# Patient Record
Sex: FEMALE | Race: WHITE | NOT HISPANIC OR LATINO | Employment: UNEMPLOYED | URBAN - METROPOLITAN AREA
[De-identification: names, ages, dates, MRNs, and addresses within clinical notes are randomized per-mention and may not be internally consistent; named-entity substitution may affect disease eponyms.]

---

## 2017-04-24 ENCOUNTER — PROCEDURE VISIT (OUTPATIENT)
Dept: OBSTETRICS AND GYNECOLOGY | Age: 27
End: 2017-04-24

## 2017-04-24 ENCOUNTER — OFFICE VISIT (OUTPATIENT)
Dept: OBSTETRICS AND GYNECOLOGY | Age: 27
End: 2017-04-24

## 2017-04-24 ENCOUNTER — TELEPHONE (OUTPATIENT)
Dept: OBSTETRICS AND GYNECOLOGY | Age: 27
End: 2017-04-24

## 2017-04-24 VITALS
HEIGHT: 69 IN | WEIGHT: 129 LBS | SYSTOLIC BLOOD PRESSURE: 122 MMHG | DIASTOLIC BLOOD PRESSURE: 70 MMHG | BODY MASS INDEX: 19.11 KG/M2

## 2017-04-24 DIAGNOSIS — O20.0 THREATENED MISCARRIAGE IN EARLY PREGNANCY: Primary | ICD-10-CM

## 2017-04-24 DIAGNOSIS — O20.0 THREATENED ABORTION: Primary | ICD-10-CM

## 2017-04-24 PROCEDURE — 99213 OFFICE O/P EST LOW 20 MIN: CPT | Performed by: OBSTETRICS & GYNECOLOGY

## 2017-04-24 PROCEDURE — 76817 TRANSVAGINAL US OBSTETRIC: CPT | Performed by: OBSTETRICS & GYNECOLOGY

## 2017-04-24 RX ORDER — PRENATAL VIT NO.126/IRON/FOLIC 28MG-0.8MG
TABLET ORAL DAILY
COMMUNITY
End: 2018-11-08

## 2017-04-24 RX ORDER — CHLORAL HYDRATE 500 MG
CAPSULE ORAL
COMMUNITY
End: 2017-06-28

## 2017-04-24 NOTE — PROGRESS NOTES
Subjective     Chief Complaint   Patient presents with   • Gynecologic Exam     Early ob with spotting,lmp 3/10/17,u/s       History of Present Illness  Iraida Solo is a 27 y.o. female is being seen today for   Chief Complaint   Patient presents with   • Gynecologic Exam     Early ob with spotting,lmp 3/10/17,u/s   Patient thinks she should be 5-6 weeks.  Has had some spotting for last 3 or 4 days and comes in for evaluation.  She has no nausea or vomiting or breast tenderness but does complain of fatigue..  Today's ultrasound is consistent with a 6 week intrauterine pregnancy positive fetal heart tones however heart rate is only in the 70s.        The following portions of the patient's history were reviewed and updated as appropriate: allergies, current medications, past family history, past medical history, past social history, past surgical history and problem list.    PAST MEDICAL HISTORY  Past Medical History:   Diagnosis Date   • Anxiety    • Migraines    • Vaginal delivery      OB History      Para Term  AB TAB SAB Ectopic Multiple Living    1         1        No past surgical history on file.  No family history on file.  History   Smoking Status   • Never Smoker   Smokeless Tobacco   • Not on file       Current Outpatient Prescriptions:   •  Omega-3 Fatty Acids (FISH OIL) 1000 MG capsule capsule, Take  by mouth Daily With Breakfast., Disp: , Rfl:   •  Prenatal Vit-Fe Fumarate-FA (PRENATAL, CLASSIC, VITAMIN) 28-0.8 MG tablet tablet, Take  by mouth Daily., Disp: , Rfl:   •  Norethin-Eth Estrad-Fe Biphas (LO LOESTRIN FE) 1 MG-10 MCG / 10 MCG tablet, Take  by mouth., Disp: , Rfl:     There is no immunization history on file for this patient.    Review of Systems       Except as outlined in history of physical illness, patient denies any changes in her GYN, , GI systems. All other systems reviewed are negative.    Objective   Physical Exam     Alert and oriented ×3  Heart regular rate and  rhythm  Abdomen soft nontender no rebound no guarding  External genitalia normal vagina shows a little bit of dark blood.  Cervix is closed uterus is 6 weeks' size adnexa not enlarged nontender      Assessment/Plan   Iraida was seen today for gynecologic exam.    Diagnoses and all orders for this visit:    Threatened miscarriage in early pregnancy  -     HCG, B-subunit, Quantitative  -     Progesterone  We had a lengthy discussion regarding the low fetal heart rate and likelihood of miscarriage.  Patient is tensely scheduled for a follow-up ultrasound next Monday.  We will call with lab results tomorrow and she will call if she hasn't heard from us.  She is known to be Rh+             EMR Dragon/ Transcription disclaimer:  Much of the encounter note is an electronic transcription/translation of spoken language to printed text. The electronic translation of spoken language may permit erroneous, or at times, nonessential words or phrases to be inadvertently transcribes; Although i have reviewed the note for such errors, some may still exist.

## 2017-04-25 ENCOUNTER — TELEPHONE (OUTPATIENT)
Dept: OBSTETRICS AND GYNECOLOGY | Age: 27
End: 2017-04-25

## 2017-04-25 LAB
HCG INTACT+B SERPL-ACNC: NORMAL MIU/ML
PROGEST SERPL-MCNC: 2.2 NG/ML

## 2017-04-25 NOTE — TELEPHONE ENCOUNTER
----- Message from Jackson Jimenez MD sent at 4/25/2017  9:31 AM EDT -----  Coby please let patient know that her hormone levels were low for gestational age.  Still would like to follow-up with schedule ultrasound next week.  Same precautions that we discussed yesterday

## 2017-04-25 NOTE — PROGRESS NOTES
Coby please let patient know that her hormone levels were low for gestational age.  Still would like to follow-up with schedule ultrasound next week.  Same precautions that we discussed yesterday

## 2017-05-01 ENCOUNTER — TELEPHONE (OUTPATIENT)
Dept: OBSTETRICS AND GYNECOLOGY | Age: 27
End: 2017-05-01

## 2017-05-02 ENCOUNTER — PROCEDURE VISIT (OUTPATIENT)
Dept: OBSTETRICS AND GYNECOLOGY | Age: 27
End: 2017-05-02

## 2017-05-02 ENCOUNTER — ROUTINE PRENATAL (OUTPATIENT)
Dept: OBSTETRICS AND GYNECOLOGY | Age: 27
End: 2017-05-02

## 2017-05-02 DIAGNOSIS — O02.1 MISSED AB: Primary | ICD-10-CM

## 2017-05-02 DIAGNOSIS — O03.9 SPONTANEOUS MISCARRIAGE: Primary | ICD-10-CM

## 2017-05-02 PROCEDURE — 76817 TRANSVAGINAL US OBSTETRIC: CPT | Performed by: OBSTETRICS & GYNECOLOGY

## 2017-05-02 PROCEDURE — 99212 OFFICE O/P EST SF 10 MIN: CPT | Performed by: OBSTETRICS & GYNECOLOGY

## 2017-06-12 ENCOUNTER — TELEPHONE (OUTPATIENT)
Dept: OBSTETRICS AND GYNECOLOGY | Age: 27
End: 2017-06-12

## 2017-06-12 NOTE — TELEPHONE ENCOUNTER
Congratulations, recommend patient being worked in with me this Thursday at 11:15 AM.  I'm going to try to work a couple patients and that they  Also encourage prenatal vitamins.  If we can get an ultrasound scheduled about that time that would be great.  If not I still want to see her

## 2017-06-12 NOTE — TELEPHONE ENCOUNTER
"Pt had a miscarriage on 04-24-17. Pt got a + preg test over the weekend, LMP: 05-18-17. Due to her history of miscarriages, pt was told to call Dr PEREZ as soon as she got a + and he would want to bring her in early.  Pt also wanted to let Dr PEREZ know that she is having some pain in her L ovary. Pt related pain to the \"twinge\" she gets when ovulating. Please advise.     Pt#: [phone]  "

## 2017-06-15 ENCOUNTER — PROCEDURE VISIT (OUTPATIENT)
Dept: OBSTETRICS AND GYNECOLOGY | Age: 27
End: 2017-06-15

## 2017-06-15 ENCOUNTER — INITIAL PRENATAL (OUTPATIENT)
Dept: OBSTETRICS AND GYNECOLOGY | Age: 27
End: 2017-06-15

## 2017-06-15 VITALS — WEIGHT: 130 LBS | DIASTOLIC BLOOD PRESSURE: 60 MMHG | SYSTOLIC BLOOD PRESSURE: 108 MMHG | BODY MASS INDEX: 19.2 KG/M2

## 2017-06-15 DIAGNOSIS — Z34.81 ENCOUNTER FOR SUPERVISION OF OTHER NORMAL PREGNANCY IN FIRST TRIMESTER: Primary | ICD-10-CM

## 2017-06-15 DIAGNOSIS — O36.80X0 ENCOUNTER TO DETERMINE FETAL VIABILITY OF PREGNANCY, NOT APPLICABLE OR UNSPECIFIED FETUS: Primary | ICD-10-CM

## 2017-06-15 DIAGNOSIS — Z3A.01 LESS THAN 8 WEEKS GESTATION OF PREGNANCY: ICD-10-CM

## 2017-06-15 PROCEDURE — 0501F PRENATAL FLOW SHEET: CPT | Performed by: OBSTETRICS & GYNECOLOGY

## 2017-06-15 PROCEDURE — 76817 TRANSVAGINAL US OBSTETRIC: CPT | Performed by: OBSTETRICS & GYNECOLOGY

## 2017-06-15 NOTE — PROGRESS NOTES
27-year-old  Ab2 white female in for pregnancy confirmation.  Patient feels like she ovulated on the .  2 early to see anything on today's ultrasound.  Physical exam was normal patient's known to be Rh+.  She's had no bleeding some breast tenderness some nausea.  Physical exam unremarkable consistent with 4-5 weeks.  Patient has a little bit of anxiety and requesting a follow-up ultrasound next week.  Miscarriage and ectopic warning signs given

## 2017-06-16 LAB
ABO GROUP BLD: (no result)
BASOPHILS # BLD AUTO: 0 X10E3/UL (ref 0–0.2)
BASOPHILS NFR BLD AUTO: 0 %
BLD GP AB SCN SERPL QL: NEGATIVE
EOSINOPHIL # BLD AUTO: 0.1 X10E3/UL (ref 0–0.4)
EOSINOPHIL NFR BLD AUTO: 2 %
ERYTHROCYTE [DISTWIDTH] IN BLOOD BY AUTOMATED COUNT: 12.8 % (ref 12.3–15.4)
HBV SURFACE AG SERPL QL IA: NEGATIVE
HCG INTACT+B SERPL-ACNC: 990.8 MIU/ML
HCT VFR BLD AUTO: 39.1 % (ref 34–46.6)
HCV AB S/CO SERPL IA: <0.1 S/CO RATIO (ref 0–0.9)
HGB BLD-MCNC: 13.3 G/DL (ref 11.1–15.9)
HIV 1+2 AB+HIV1 P24 AG SERPL QL IA: NON REACTIVE
IMM GRANULOCYTES # BLD: 0 X10E3/UL (ref 0–0.1)
IMM GRANULOCYTES NFR BLD: 0 %
LYMPHOCYTES # BLD AUTO: 1.7 X10E3/UL (ref 0.7–3.1)
LYMPHOCYTES NFR BLD AUTO: 27 %
MCH RBC QN AUTO: 29 PG (ref 26.6–33)
MCHC RBC AUTO-ENTMCNC: 34 G/DL (ref 31.5–35.7)
MCV RBC AUTO: 85 FL (ref 79–97)
MONOCYTES # BLD AUTO: 0.5 X10E3/UL (ref 0.1–0.9)
MONOCYTES NFR BLD AUTO: 7 %
NEUTROPHILS # BLD AUTO: 4.1 X10E3/UL (ref 1.4–7)
NEUTROPHILS NFR BLD AUTO: 64 %
PLATELET # BLD AUTO: 208 X10E3/UL (ref 150–379)
PROGEST SERPL-MCNC: 20.8 NG/ML
RBC # BLD AUTO: 4.58 X10E6/UL (ref 3.77–5.28)
RH BLD: POSITIVE
RPR SER QL: NON REACTIVE
RUBV IGG SERPL IA-ACNC: 1.32 INDEX
VZV IGG SER IA-ACNC: 3849 INDEX
WBC # BLD AUTO: 6.5 X10E3/UL (ref 3.4–10.8)

## 2017-06-17 LAB
BACTERIA UR CULT: NO GROWTH
BACTERIA UR CULT: NORMAL

## 2017-06-19 ENCOUNTER — TELEPHONE (OUTPATIENT)
Dept: OBSTETRICS AND GYNECOLOGY | Age: 27
End: 2017-06-19

## 2017-06-19 NOTE — TELEPHONE ENCOUNTER
Pt was seen on 06-15-17. Pt called today looking for lab results from that visit. Labs have not been reviewed. Please advise.     Pt#: [phone]

## 2017-06-19 NOTE — TELEPHONE ENCOUNTER
Please notify patient that if her gallbladder is continued act up would recommend following up with her PCP.  Keep an eye on the pelvic spotting but I'm glad it has discontinued.  Let us know if anything changes as far as vaginal bleeding goes.

## 2017-06-19 NOTE — TELEPHONE ENCOUNTER
Pt not'd of lab results.     Pt did state that she had a brown discharge Saturday morning and yesterday evening. Pt states it was only those two times and it was just on the toilet paper. Pt has not have it since then. Pt also stated that she thinks her gallbladder is acting up. Pt states that wed-fri of last week her poop was green and smelled really weird. It is more normal now. Pt states she has also been having some back pain and some pain under her ribs as well. Please advise.

## 2017-06-28 ENCOUNTER — ROUTINE PRENATAL (OUTPATIENT)
Dept: OBSTETRICS AND GYNECOLOGY | Age: 27
End: 2017-06-28

## 2017-06-28 ENCOUNTER — PROCEDURE VISIT (OUTPATIENT)
Dept: OBSTETRICS AND GYNECOLOGY | Age: 27
End: 2017-06-28

## 2017-06-28 VITALS — SYSTOLIC BLOOD PRESSURE: 120 MMHG | DIASTOLIC BLOOD PRESSURE: 70 MMHG | WEIGHT: 129 LBS | BODY MASS INDEX: 19.05 KG/M2

## 2017-06-28 DIAGNOSIS — Z13.9 SCREENING: Primary | ICD-10-CM

## 2017-06-28 DIAGNOSIS — O36.80X0 PREGNANCY WITH INCONCLUSIVE FETAL VIABILITY, NOT APPLICABLE OR UNSPECIFIED FETUS: ICD-10-CM

## 2017-06-28 DIAGNOSIS — Z3A.01 5 WEEKS GESTATION OF PREGNANCY: ICD-10-CM

## 2017-06-28 DIAGNOSIS — O26.851 SPOTTING AFFECTING PREGNANCY IN FIRST TRIMESTER: ICD-10-CM

## 2017-06-28 PROCEDURE — 76817 TRANSVAGINAL US OBSTETRIC: CPT | Performed by: OBSTETRICS & GYNECOLOGY

## 2017-06-28 PROCEDURE — 0502F SUBSEQUENT PRENATAL CARE: CPT | Performed by: OBSTETRICS & GYNECOLOGY

## 2017-06-28 NOTE — PROGRESS NOTES
Ultrasound today consistent with 6 weeks positive cardiac activity.  We will recheck progesterone level repeat ultrasound at next visit.  Patient is symptomatic.  No vaginal bleeding.  She will cause with any change in her condition

## 2017-06-29 ENCOUNTER — TELEPHONE (OUTPATIENT)
Dept: OBSTETRICS AND GYNECOLOGY | Age: 27
End: 2017-06-29

## 2017-06-29 LAB — PROGEST SERPL-MCNC: 12 NG/ML

## 2017-06-29 NOTE — TELEPHONE ENCOUNTER
----- Message from Jackson Jimenez MD sent at 6/29/2017  7:44 AM EDT -----  Please call the patient regarding her normal result.  Coby I believe a previous message was sent but please let patient know progesterone was in the normal range but low normal and I have called her in some Prometrium to take until her next appointment

## 2017-07-05 ENCOUNTER — TELEPHONE (OUTPATIENT)
Dept: OBSTETRICS AND GYNECOLOGY | Age: 27
End: 2017-07-05

## 2017-07-05 DIAGNOSIS — Z32.01 POSITIVE PREGNANCY TEST: Primary | ICD-10-CM

## 2017-07-05 NOTE — TELEPHONE ENCOUNTER
Dr PEREZ pt, has hx of miscarriage and would like to come in for hcg and progesterone. Can we place these orders so pt can come in Friday for lab work?    Pt # [phone] ok to leave VM that lab orders have been placed

## 2017-07-08 LAB
HCG INTACT+B SERPL-ACNC: NORMAL MIU/ML
PROGEST SERPL-MCNC: 10.8 NG/ML

## 2017-07-10 ENCOUNTER — TELEPHONE (OUTPATIENT)
Dept: OBSTETRICS AND GYNECOLOGY | Age: 27
End: 2017-07-10

## 2017-07-10 NOTE — TELEPHONE ENCOUNTER
Pt knows that her progesterone and HCG labs were coming back today. Pt just wanted to let Dr Jimenez and Coby know that if her progesterone levels are a little low it is probably because she forgot to take her 200 mg progesterone capsules for about 3 days while on vacation last week. She didn't take them 07/3-07/6. Pt didn't take her capsules again until the night that she came in for her labs.

## 2017-07-10 NOTE — TELEPHONE ENCOUNTER
Please let patient know she is correct her progesterone is just barely low and resuming the progesterone is correct. HCG value has increased nicely. Keep follow-up appointment

## 2017-07-11 ENCOUNTER — TELEPHONE (OUTPATIENT)
Dept: OBSTETRICS AND GYNECOLOGY | Age: 27
End: 2017-07-11

## 2017-07-11 DIAGNOSIS — Z13.9 SCREENING: Primary | ICD-10-CM

## 2017-07-13 ENCOUNTER — TELEPHONE (OUTPATIENT)
Dept: OBSTETRICS AND GYNECOLOGY | Age: 27
End: 2017-07-13

## 2017-07-13 LAB
HCG INTACT+B SERPL-ACNC: NORMAL MIU/ML
PROGEST SERPL-MCNC: 17.5 NG/ML

## 2017-07-13 NOTE — TELEPHONE ENCOUNTER
----- Message from Jackson Jimenez MD sent at 7/13/2017 12:34 PM EDT -----  Please notify pt. That labs are with in normal limits

## 2017-07-26 ENCOUNTER — PROCEDURE VISIT (OUTPATIENT)
Dept: OBSTETRICS AND GYNECOLOGY | Age: 27
End: 2017-07-26

## 2017-07-26 ENCOUNTER — ROUTINE PRENATAL (OUTPATIENT)
Dept: OBSTETRICS AND GYNECOLOGY | Age: 27
End: 2017-07-26

## 2017-07-26 VITALS — SYSTOLIC BLOOD PRESSURE: 122 MMHG | DIASTOLIC BLOOD PRESSURE: 70 MMHG | WEIGHT: 132 LBS | BODY MASS INDEX: 19.49 KG/M2

## 2017-07-26 DIAGNOSIS — Z13.9 SCREENING: Primary | ICD-10-CM

## 2017-07-26 DIAGNOSIS — Z34.81 PRENATAL CARE, SUBSEQUENT PREGNANCY, FIRST TRIMESTER: ICD-10-CM

## 2017-07-26 DIAGNOSIS — O36.80X0 PREGNANCY WITH INCONCLUSIVE FETAL VIABILITY, NOT APPLICABLE OR UNSPECIFIED FETUS: ICD-10-CM

## 2017-07-26 DIAGNOSIS — Z3A.09 9 WEEKS GESTATION OF PREGNANCY: Primary | ICD-10-CM

## 2017-07-26 PROCEDURE — 76817 TRANSVAGINAL US OBSTETRIC: CPT | Performed by: OBSTETRICS & GYNECOLOGY

## 2017-07-26 PROCEDURE — 0502F SUBSEQUENT PRENATAL CARE: CPT | Performed by: OBSTETRICS & GYNECOLOGY

## 2017-07-26 NOTE — PROGRESS NOTES
Patient still with nausea no bleeding or spotting or cramping ultrasound positive heart beat today and good growth. Continue Prometrium until 13 weeks we will check a progesterone level today patient to call with any problems questions concerns

## 2017-07-27 ENCOUNTER — TELEPHONE (OUTPATIENT)
Dept: OBSTETRICS AND GYNECOLOGY | Age: 27
End: 2017-07-27

## 2017-07-28 NOTE — TELEPHONE ENCOUNTER
Those results still are not back.  I verified with the lab that it was drawn and sent but there is still no result.

## 2017-07-30 LAB — 17OHP SERPL-MCNC: 59 NG/DL

## 2017-07-31 ENCOUNTER — TELEPHONE (OUTPATIENT)
Dept: OBSTETRICS AND GYNECOLOGY | Age: 27
End: 2017-07-31

## 2017-08-25 ENCOUNTER — ROUTINE PRENATAL (OUTPATIENT)
Dept: OBSTETRICS AND GYNECOLOGY | Age: 27
End: 2017-08-25

## 2017-08-25 VITALS — WEIGHT: 140.4 LBS | BODY MASS INDEX: 20.73 KG/M2

## 2017-08-25 DIAGNOSIS — Z13.9 SCREENING: Primary | ICD-10-CM

## 2017-08-25 LAB
BILIRUB BLD-MCNC: NEGATIVE MG/DL
CLARITY, POC: CLEAR
COLOR UR: YELLOW
GLUCOSE UR STRIP-MCNC: NEGATIVE MG/DL
KETONES UR QL: NEGATIVE
LEUKOCYTE EST, POC: NEGATIVE
NITRITE UR-MCNC: NEGATIVE MG/ML
PH UR: 6.5 [PH] (ref 5–8)
PROT UR STRIP-MCNC: NEGATIVE MG/DL
RBC # UR STRIP: NEGATIVE /UL
SP GR UR: 1.02 (ref 1–1.03)
UROBILINOGEN UR QL: NORMAL

## 2017-08-25 PROCEDURE — 81002 URINALYSIS NONAUTO W/O SCOPE: CPT | Performed by: PHYSICIAN ASSISTANT

## 2017-08-25 PROCEDURE — 0502F SUBSEQUENT PRENATAL CARE: CPT | Performed by: PHYSICIAN ASSISTANT

## 2017-08-25 NOTE — PROGRESS NOTES
This is a 14 wk pregnant pt that is here for a routine belly check. She has no c/o. Possibly starting to feel some movement.  Feels better, eating well and notes an 8 lb weight gain.  She does note that she was underweight when she became pregnant. Denies excess eating and I just advised her to make healthy choices.  A/P 14 wk pregnant pt here for routine check up, plan f/u in 4 wks and can plan anatomic scan at that time. Call for any problems

## 2017-09-20 ENCOUNTER — PROCEDURE VISIT (OUTPATIENT)
Dept: OBSTETRICS AND GYNECOLOGY | Age: 27
End: 2017-09-20

## 2017-09-20 ENCOUNTER — ROUTINE PRENATAL (OUTPATIENT)
Dept: OBSTETRICS AND GYNECOLOGY | Age: 27
End: 2017-09-20

## 2017-09-20 VITALS — DIASTOLIC BLOOD PRESSURE: 70 MMHG | SYSTOLIC BLOOD PRESSURE: 120 MMHG | WEIGHT: 144 LBS | BODY MASS INDEX: 21.27 KG/M2

## 2017-09-20 DIAGNOSIS — Z3A.18 18 WEEKS GESTATION OF PREGNANCY: Primary | ICD-10-CM

## 2017-09-20 DIAGNOSIS — Z36.89 SCREENING, ANTENATAL, FOR FETAL ANATOMIC SURVEY: ICD-10-CM

## 2017-09-20 PROCEDURE — 76805 OB US >/= 14 WKS SNGL FETUS: CPT | Performed by: OBSTETRICS & GYNECOLOGY

## 2017-09-20 PROCEDURE — 0502F SUBSEQUENT PRENATAL CARE: CPT | Performed by: OBSTETRICS & GYNECOLOGY

## 2017-09-20 NOTE — PROGRESS NOTES
HPI: 27 y.o.  at 18w3d , who underwent ultrasound today consistent with a baby boy. Limitations of ultrasound reviewed. Confirms working due date of 218    Vitals:    17 0942   BP: 120/70   Weight: 144 lb (65.3 kg)     [unfilled]      ROS:  GI:  Negative  : No bleeding no discharge  Pulmonary: Negative     A/P  1. Intrauterine pregnancy at 18w3d   2. Pregnancy Risk:  NORMAL    Iraida was seen today for pregnancy ultrasound.    Diagnoses and all orders for this visit:    18 weeks gestation of pregnancy    If patient doesn't get flu shot at work we will receive next visit    -----------------------    PLAN:   Return in about 4 weeks (around 10/18/2017) for Recheck.      Jackson Jimenez MD  2017 10:24 AM

## 2017-09-29 ENCOUNTER — CLINICAL SUPPORT (OUTPATIENT)
Dept: OBSTETRICS AND GYNECOLOGY | Age: 27
End: 2017-09-29

## 2017-09-29 DIAGNOSIS — Z23 NEED FOR IMMUNIZATION AGAINST INFLUENZA: ICD-10-CM

## 2017-09-29 DIAGNOSIS — Z34.90 PREGNANCY, UNSPECIFIED GESTATIONAL AGE: ICD-10-CM

## 2017-09-29 PROCEDURE — 90471 IMMUNIZATION ADMIN: CPT | Performed by: OBSTETRICS & GYNECOLOGY

## 2017-09-29 PROCEDURE — 90686 IIV4 VACC NO PRSV 0.5 ML IM: CPT | Performed by: OBSTETRICS & GYNECOLOGY

## 2017-10-16 ENCOUNTER — TELEPHONE (OUTPATIENT)
Dept: OBSTETRICS AND GYNECOLOGY | Age: 27
End: 2017-10-16

## 2017-10-16 ENCOUNTER — OFFICE VISIT (OUTPATIENT)
Dept: OBSTETRICS AND GYNECOLOGY | Age: 27
End: 2017-10-16

## 2017-10-16 ENCOUNTER — ROUTINE PRENATAL (OUTPATIENT)
Dept: OBSTETRICS AND GYNECOLOGY | Age: 27
End: 2017-10-16

## 2017-10-16 VITALS — BODY MASS INDEX: 22.15 KG/M2 | DIASTOLIC BLOOD PRESSURE: 76 MMHG | SYSTOLIC BLOOD PRESSURE: 120 MMHG | WEIGHT: 150 LBS

## 2017-10-16 VITALS — SYSTOLIC BLOOD PRESSURE: 120 MMHG | DIASTOLIC BLOOD PRESSURE: 76 MMHG

## 2017-10-16 DIAGNOSIS — Z3A.22 22 WEEKS GESTATION OF PREGNANCY: ICD-10-CM

## 2017-10-16 DIAGNOSIS — Z3A.22 22 WEEKS GESTATION OF PREGNANCY: Primary | ICD-10-CM

## 2017-10-16 PROCEDURE — 99213 OFFICE O/P EST LOW 20 MIN: CPT | Performed by: OBSTETRICS & GYNECOLOGY

## 2017-10-16 NOTE — PROGRESS NOTES
HPI: 27 y.o.  at 22w1d who called in and ordered be seen because she had what she thought was a varicose vein on her left perineum. She's had good fetal activity no contractions no bleeding no discharge    Vitals:    10/16/17 1507   BP: 120/76   Weight: 150 lb (68 kg)         ROS:  GI:  Negative  : No bleeding, no change in bowel movements,  Pulmonary: Negative     Extra genitalia normal except for the presence of small varicose veins on the left perineum vagina normal cervix is long thick and closed fetal heart tones normal extremities normal    A/P  1. Intrauterine pregnancy at 22w1d   2. Pregnancy Risk:  NORMAL    Iraida was seen today for pregnancy problem.    Diagnoses and all orders for this visit:    Varicose vein of leg, postpartum  Symptomatic relief discussed,  22 weeks gestation of pregnancy  : Hemoglobin next visit, patient has artery received flu shot, pertussis next visit.      -----------------------    PLAN:   Return in about 4 weeks (around 2017) for Recheck.      Jackson Jimenez MD  10/16/2017 3:14 PM

## 2017-10-16 NOTE — TELEPHONE ENCOUNTER
Pt is 22 wks preg and states since last Thursday has noticed an increase in pelvic pressure. Does become painful the longer she stands, has to frequently rest and elevate legs. Pt states she has also noticed the vein on her L vulva in really showing/swollen/sore. Pt would like to be seen today. Please advise.    Pt # [phone]

## 2017-11-15 ENCOUNTER — ROUTINE PRENATAL (OUTPATIENT)
Dept: OBSTETRICS AND GYNECOLOGY | Age: 27
End: 2017-11-15

## 2017-11-15 VITALS — WEIGHT: 157 LBS | DIASTOLIC BLOOD PRESSURE: 70 MMHG | BODY MASS INDEX: 23.18 KG/M2 | SYSTOLIC BLOOD PRESSURE: 118 MMHG

## 2017-11-15 DIAGNOSIS — Z34.82 PRENATAL CARE, SUBSEQUENT PREGNANCY, SECOND TRIMESTER: Primary | ICD-10-CM

## 2017-11-15 LAB
GLUCOSE 1H P 50 G GLC PO SERPL-MCNC: 98 MG/DL (ref 65–139)
HGB BLD-MCNC: 11.4 G/DL (ref 11.9–15.5)

## 2017-11-15 PROCEDURE — 90471 IMMUNIZATION ADMIN: CPT | Performed by: OBSTETRICS & GYNECOLOGY

## 2017-11-15 PROCEDURE — 0502F SUBSEQUENT PRENATAL CARE: CPT | Performed by: OBSTETRICS & GYNECOLOGY

## 2017-11-15 PROCEDURE — 90715 TDAP VACCINE 7 YRS/> IM: CPT | Performed by: OBSTETRICS & GYNECOLOGY

## 2017-11-15 NOTE — PROGRESS NOTES
Patient feeling better, good fetal movement, glucose hemoglobin and pertussis today fetal heart tones fundal height extremities normal return in 4 weeks call with any change

## 2017-11-16 ENCOUNTER — TELEPHONE (OUTPATIENT)
Dept: OBSTETRICS AND GYNECOLOGY | Age: 27
End: 2017-11-16

## 2017-12-13 ENCOUNTER — ROUTINE PRENATAL (OUTPATIENT)
Dept: OBSTETRICS AND GYNECOLOGY | Age: 27
End: 2017-12-13

## 2017-12-13 VITALS — SYSTOLIC BLOOD PRESSURE: 122 MMHG | BODY MASS INDEX: 24.07 KG/M2 | WEIGHT: 163 LBS | DIASTOLIC BLOOD PRESSURE: 72 MMHG

## 2017-12-13 DIAGNOSIS — Z3A.30 30 WEEKS GESTATION OF PREGNANCY: Primary | ICD-10-CM

## 2017-12-13 PROCEDURE — 0502F SUBSEQUENT PRENATAL CARE: CPT | Performed by: OBSTETRICS & GYNECOLOGY

## 2017-12-13 NOTE — PROGRESS NOTES
Patient in for prenatal visit, overall doing well fetal heart tones fundal height extremities normal reviewed signs symptoms of labor. Discussed the fact that she went a little bit early last delivery and had an uncomplicated delivery.

## 2017-12-29 ENCOUNTER — ROUTINE PRENATAL (OUTPATIENT)
Dept: OBSTETRICS AND GYNECOLOGY | Age: 27
End: 2017-12-29

## 2017-12-29 VITALS — DIASTOLIC BLOOD PRESSURE: 62 MMHG | SYSTOLIC BLOOD PRESSURE: 114 MMHG | BODY MASS INDEX: 24.54 KG/M2 | WEIGHT: 166.2 LBS

## 2017-12-29 DIAGNOSIS — Z3A.32 32 WEEKS GESTATION OF PREGNANCY: Primary | ICD-10-CM

## 2017-12-29 PROCEDURE — 0502F SUBSEQUENT PRENATAL CARE: CPT | Performed by: OBSTETRICS & GYNECOLOGY

## 2017-12-29 NOTE — PROGRESS NOTES
32 week IUP in for prenatal visit overall patient states she is doing well good fetal activity no concerns or complaints fundal height fetal heart tones extremities normal signs symptoms of labor reviewed return in 2 weeks call as needed

## 2018-01-10 ENCOUNTER — ROUTINE PRENATAL (OUTPATIENT)
Dept: OBSTETRICS AND GYNECOLOGY | Age: 28
End: 2018-01-10

## 2018-01-10 VITALS — DIASTOLIC BLOOD PRESSURE: 66 MMHG | BODY MASS INDEX: 24.37 KG/M2 | WEIGHT: 165 LBS | SYSTOLIC BLOOD PRESSURE: 110 MMHG

## 2018-01-10 DIAGNOSIS — Z3A.34 34 WEEKS GESTATION OF PREGNANCY: Primary | ICD-10-CM

## 2018-01-10 PROCEDURE — 0502F SUBSEQUENT PRENATAL CARE: CPT | Performed by: OBSTETRICS & GYNECOLOGY

## 2018-01-10 NOTE — PROGRESS NOTES
Overall doing well occasional mild contractions fetal heart tones fundal height extremities normal reviewed signs symptoms of labor check cervix and GBS next visit

## 2018-01-24 ENCOUNTER — ROUTINE PRENATAL (OUTPATIENT)
Dept: OBSTETRICS AND GYNECOLOGY | Age: 28
End: 2018-01-24

## 2018-01-24 VITALS — BODY MASS INDEX: 25.1 KG/M2 | WEIGHT: 170 LBS | DIASTOLIC BLOOD PRESSURE: 72 MMHG | SYSTOLIC BLOOD PRESSURE: 118 MMHG

## 2018-01-24 DIAGNOSIS — Z34.83 PRENATAL CARE, SUBSEQUENT PREGNANCY, THIRD TRIMESTER: Primary | ICD-10-CM

## 2018-01-24 PROCEDURE — 0502F SUBSEQUENT PRENATAL CARE: CPT | Performed by: OBSTETRICS & GYNECOLOGY

## 2018-01-24 NOTE — PROGRESS NOTES
36 week IUP, view of systems negative, fetal heart tones fundal height extremities normal cervix fingertip 50% -2 signs symptoms of labor reviewed GBS collected note to stop working February 1 given to patient

## 2018-01-26 LAB — GP B STREP DNA SPEC QL NAA+PROBE: NEGATIVE

## 2018-01-30 ENCOUNTER — ROUTINE PRENATAL (OUTPATIENT)
Dept: OBSTETRICS AND GYNECOLOGY | Age: 28
End: 2018-01-30

## 2018-01-30 VITALS — DIASTOLIC BLOOD PRESSURE: 70 MMHG | WEIGHT: 173 LBS | BODY MASS INDEX: 25.55 KG/M2 | SYSTOLIC BLOOD PRESSURE: 114 MMHG

## 2018-01-30 DIAGNOSIS — Z34.80 PRENATAL CARE OF MULTIGRAVIDA, ANTEPARTUM: Primary | ICD-10-CM

## 2018-01-30 DIAGNOSIS — O99.019 ANEMIA AFFECTING PREGNANCY, ANTEPARTUM: ICD-10-CM

## 2018-01-30 PROCEDURE — 0502F SUBSEQUENT PRENATAL CARE: CPT | Performed by: OBSTETRICS & GYNECOLOGY

## 2018-01-30 NOTE — PROGRESS NOTES
37 weeks, review of systems negative, fundal height fetal heart tones extremities normal cervix 1 cm 50% -2 vertex, GBS negative, signs symptoms labor reviewed continue prenatal vitamins and iron

## 2018-02-06 ENCOUNTER — ROUTINE PRENATAL (OUTPATIENT)
Dept: OBSTETRICS AND GYNECOLOGY | Age: 28
End: 2018-02-06

## 2018-02-06 VITALS — DIASTOLIC BLOOD PRESSURE: 80 MMHG | WEIGHT: 175 LBS | SYSTOLIC BLOOD PRESSURE: 138 MMHG | BODY MASS INDEX: 25.84 KG/M2

## 2018-02-06 DIAGNOSIS — Z3A.38 38 WEEKS GESTATION OF PREGNANCY: Primary | ICD-10-CM

## 2018-02-06 PROCEDURE — 0502F SUBSEQUENT PRENATAL CARE: CPT | Performed by: OBSTETRICS & GYNECOLOGY

## 2018-02-06 NOTE — PROGRESS NOTES
Overall doing well, good fetal activity no bleeding no leakage of fluid, cervix to 60% -2 but still very posterior  On symptoms of labor reviewed

## 2018-02-13 ENCOUNTER — ROUTINE PRENATAL (OUTPATIENT)
Dept: OBSTETRICS AND GYNECOLOGY | Age: 28
End: 2018-02-13

## 2018-02-13 VITALS — SYSTOLIC BLOOD PRESSURE: 124 MMHG | DIASTOLIC BLOOD PRESSURE: 74 MMHG | WEIGHT: 176 LBS | BODY MASS INDEX: 25.99 KG/M2

## 2018-02-13 DIAGNOSIS — Z34.80 PRENATAL CARE OF MULTIGRAVIDA, ANTEPARTUM: Primary | ICD-10-CM

## 2018-02-13 DIAGNOSIS — O99.019 ANEMIA AFFECTING PREGNANCY, ANTEPARTUM: ICD-10-CM

## 2018-02-13 PROCEDURE — 0502F SUBSEQUENT PRENATAL CARE: CPT | Performed by: OBSTETRICS & GYNECOLOGY

## 2018-02-13 NOTE — PROGRESS NOTES
39 week IUP in for prenatal visit. Patient continues to do well. Excellent fetal activity fundal height fetal heart tones extremities normal cervix is 3/50% but still a little posterior. Signed symptoms of labor reviewed patient declines induction this week but if not delivered by next week would like to be induced

## 2018-02-14 ENCOUNTER — HOSPITAL ENCOUNTER (INPATIENT)
Facility: HOSPITAL | Age: 28
LOS: 2 days | Discharge: HOME | End: 2018-02-16
Attending: OBSTETRICS & GYNECOLOGY | Admitting: OBSTETRICS & GYNECOLOGY

## 2018-02-14 ENCOUNTER — ANESTHESIA (OUTPATIENT)
Dept: LABOR AND DELIVERY | Facility: HOSPITAL | Age: 28
End: 2018-02-14

## 2018-02-14 ENCOUNTER — ANESTHESIA EVENT (OUTPATIENT)
Dept: LABOR AND DELIVERY | Facility: HOSPITAL | Age: 28
End: 2018-02-14

## 2018-02-14 PROBLEM — O16.9 HYPERTENSION AFFECTING PREGNANCY, ANTEPARTUM: Status: ACTIVE | Noted: 2018-02-14

## 2018-02-14 PROBLEM — Z34.90 PREGNANCY: Status: ACTIVE | Noted: 2018-02-14

## 2018-02-14 LAB
ABO GROUP BLD: NORMAL
ALBUMIN SERPL-MCNC: 3.5 G/DL (ref 3.5–5.2)
ALBUMIN/GLOB SERPL: 1 G/DL
ALP SERPL-CCNC: 99 U/L (ref 39–117)
ALT SERPL W P-5'-P-CCNC: 16 U/L (ref 1–33)
ANION GAP SERPL CALCULATED.3IONS-SCNC: 10.7 MMOL/L
AST SERPL-CCNC: 22 U/L (ref 1–32)
BASOPHILS # BLD AUTO: 0.02 10*3/MM3 (ref 0–0.2)
BASOPHILS NFR BLD AUTO: 0.1 % (ref 0–1.5)
BILIRUB SERPL-MCNC: 0.3 MG/DL (ref 0.1–1.2)
BLD GP AB SCN SERPL QL: NEGATIVE
BUN BLD-MCNC: 6 MG/DL (ref 6–20)
BUN/CREAT SERPL: 10.2 (ref 7–25)
CALCIUM SPEC-SCNC: 8.5 MG/DL (ref 8.6–10.5)
CHLORIDE SERPL-SCNC: 97 MMOL/L (ref 98–107)
CO2 SERPL-SCNC: 25.3 MMOL/L (ref 22–29)
CREAT BLD-MCNC: 0.59 MG/DL (ref 0.57–1)
DEPRECATED RDW RBC AUTO: 42.4 FL (ref 37–54)
EOSINOPHIL # BLD AUTO: 0.09 10*3/MM3 (ref 0–0.7)
EOSINOPHIL NFR BLD AUTO: 0.6 % (ref 0.3–6.2)
ERYTHROCYTE [DISTWIDTH] IN BLOOD BY AUTOMATED COUNT: 13.2 % (ref 11.7–13)
GFR SERPL CREATININE-BSD FRML MDRD: 124 ML/MIN/1.73
GLOBULIN UR ELPH-MCNC: 3.4 GM/DL
GLUCOSE BLD-MCNC: 90 MG/DL (ref 65–99)
HCT VFR BLD AUTO: 37.7 % (ref 35.6–45.5)
HGB BLD-MCNC: 12.9 G/DL (ref 11.9–15.5)
IMM GRANULOCYTES # BLD: 0.07 10*3/MM3 (ref 0–0.03)
IMM GRANULOCYTES NFR BLD: 0.5 % (ref 0–0.5)
LYMPHOCYTES # BLD AUTO: 1.88 10*3/MM3 (ref 0.9–4.8)
LYMPHOCYTES NFR BLD AUTO: 13.2 % (ref 19.6–45.3)
MCH RBC QN AUTO: 30.1 PG (ref 26.9–32)
MCHC RBC AUTO-ENTMCNC: 34.2 G/DL (ref 32.4–36.3)
MCV RBC AUTO: 88.1 FL (ref 80.5–98.2)
MONOCYTES # BLD AUTO: 0.89 10*3/MM3 (ref 0.2–1.2)
MONOCYTES NFR BLD AUTO: 6.2 % (ref 5–12)
NEUTROPHILS # BLD AUTO: 11.31 10*3/MM3 (ref 1.9–8.1)
NEUTROPHILS NFR BLD AUTO: 79.4 % (ref 42.7–76)
PLATELET # BLD AUTO: 133 10*3/MM3 (ref 140–500)
PMV BLD AUTO: 12 FL (ref 6–12)
POTASSIUM BLD-SCNC: 3.6 MMOL/L (ref 3.5–5.2)
PROT SERPL-MCNC: 6.9 G/DL (ref 6–8.5)
RBC # BLD AUTO: 4.28 10*6/MM3 (ref 3.9–5.2)
RH BLD: POSITIVE
SODIUM BLD-SCNC: 133 MMOL/L (ref 136–145)
WBC NRBC COR # BLD: 14.26 10*3/MM3 (ref 4.5–10.7)

## 2018-02-14 PROCEDURE — 59400 OBSTETRICAL CARE: CPT | Performed by: OBSTETRICS & GYNECOLOGY

## 2018-02-14 PROCEDURE — 80053 COMPREHEN METABOLIC PANEL: CPT | Performed by: OBSTETRICS & GYNECOLOGY

## 2018-02-14 PROCEDURE — 86850 RBC ANTIBODY SCREEN: CPT | Performed by: OBSTETRICS & GYNECOLOGY

## 2018-02-14 PROCEDURE — 10907ZC DRAINAGE OF AMNIOTIC FLUID, THERAPEUTIC FROM PRODUCTS OF CONCEPTION, VIA NATURAL OR ARTIFICIAL OPENING: ICD-10-PCS | Performed by: OBSTETRICS & GYNECOLOGY

## 2018-02-14 PROCEDURE — 86900 BLOOD TYPING SEROLOGIC ABO: CPT | Performed by: OBSTETRICS & GYNECOLOGY

## 2018-02-14 PROCEDURE — 0DQR0ZZ REPAIR ANAL SPHINCTER, OPEN APPROACH: ICD-10-PCS | Performed by: OBSTETRICS & GYNECOLOGY

## 2018-02-14 PROCEDURE — 25010000003 CEFAZOLIN IN DEXTROSE 2-4 GM/100ML-% SOLUTION: Performed by: OBSTETRICS & GYNECOLOGY

## 2018-02-14 PROCEDURE — 86901 BLOOD TYPING SEROLOGIC RH(D): CPT | Performed by: OBSTETRICS & GYNECOLOGY

## 2018-02-14 PROCEDURE — 85025 COMPLETE CBC W/AUTO DIFF WBC: CPT | Performed by: OBSTETRICS & GYNECOLOGY

## 2018-02-14 PROCEDURE — C1755 CATHETER, INTRASPINAL: HCPCS | Performed by: ANESTHESIOLOGY

## 2018-02-14 RX ORDER — PHYTONADIONE 1 MG/.5ML
INJECTION, EMULSION INTRAMUSCULAR; INTRAVENOUS; SUBCUTANEOUS
Status: DISPENSED
Start: 2018-02-14 | End: 2018-02-15

## 2018-02-14 RX ORDER — CHLORAL HYDRATE 500 MG
1000 CAPSULE ORAL
COMMUNITY
End: 2018-11-08

## 2018-02-14 RX ORDER — SODIUM CHLORIDE 0.9 % (FLUSH) 0.9 %
1-10 SYRINGE (ML) INJECTION AS NEEDED
Status: DISCONTINUED | OUTPATIENT
Start: 2018-02-14 | End: 2018-02-16 | Stop reason: HOSPADM

## 2018-02-14 RX ORDER — ERYTHROMYCIN 5 MG/G
OINTMENT OPHTHALMIC
Status: DISPENSED
Start: 2018-02-14 | End: 2018-02-15

## 2018-02-14 RX ORDER — ZOLPIDEM TARTRATE 5 MG/1
5 TABLET ORAL NIGHTLY PRN
Status: DISCONTINUED | OUTPATIENT
Start: 2018-02-14 | End: 2018-02-16 | Stop reason: HOSPADM

## 2018-02-14 RX ORDER — LIDOCAINE HYDROCHLORIDE 10 MG/ML
5 INJECTION, SOLUTION EPIDURAL; INFILTRATION; INTRACAUDAL; PERINEURAL AS NEEDED
Status: DISCONTINUED | OUTPATIENT
Start: 2018-02-14 | End: 2018-02-16 | Stop reason: HOSPADM

## 2018-02-14 RX ORDER — LANOLIN 100 %
OINTMENT (GRAM) TOPICAL
Status: DISCONTINUED | OUTPATIENT
Start: 2018-02-14 | End: 2018-02-16 | Stop reason: HOSPADM

## 2018-02-14 RX ORDER — OXYTOCIN-SODIUM CHLORIDE 0.9% IV SOLN 30 UNIT/500ML 30-0.9/5 UT/ML-%
125 SOLUTION INTRAVENOUS CONTINUOUS PRN
Status: COMPLETED | OUTPATIENT
Start: 2018-02-14 | End: 2018-02-14

## 2018-02-14 RX ORDER — MISOPROSTOL 200 UG/1
600 TABLET ORAL ONCE AS NEEDED
Status: DISCONTINUED | OUTPATIENT
Start: 2018-02-14 | End: 2018-02-16 | Stop reason: HOSPADM

## 2018-02-14 RX ORDER — LIDOCAINE HYDROCHLORIDE AND EPINEPHRINE 15; 5 MG/ML; UG/ML
INJECTION, SOLUTION EPIDURAL AS NEEDED
Status: DISCONTINUED | OUTPATIENT
Start: 2018-02-14 | End: 2018-02-14 | Stop reason: SURG

## 2018-02-14 RX ORDER — SODIUM CHLORIDE, SODIUM LACTATE, POTASSIUM CHLORIDE, CALCIUM CHLORIDE 600; 310; 30; 20 MG/100ML; MG/100ML; MG/100ML; MG/100ML
125 INJECTION, SOLUTION INTRAVENOUS CONTINUOUS
Status: DISCONTINUED | OUTPATIENT
Start: 2018-02-14 | End: 2018-02-16 | Stop reason: HOSPADM

## 2018-02-14 RX ORDER — FAMOTIDINE 10 MG/ML
20 INJECTION, SOLUTION INTRAVENOUS ONCE AS NEEDED
Status: DISCONTINUED | OUTPATIENT
Start: 2018-02-14 | End: 2018-02-16 | Stop reason: HOSPADM

## 2018-02-14 RX ORDER — OXYTOCIN-SODIUM CHLORIDE 0.9% IV SOLN 30 UNIT/500ML 30-0.9/5 UT/ML-%
999 SOLUTION INTRAVENOUS ONCE
Status: COMPLETED | OUTPATIENT
Start: 2018-02-14 | End: 2018-02-14

## 2018-02-14 RX ORDER — MISOPROSTOL 200 UG/1
800 TABLET ORAL AS NEEDED
Status: DISCONTINUED | OUTPATIENT
Start: 2018-02-14 | End: 2018-02-14 | Stop reason: HOSPADM

## 2018-02-14 RX ORDER — CARBOPROST TROMETHAMINE 250 UG/ML
250 INJECTION, SOLUTION INTRAMUSCULAR AS NEEDED
Status: DISCONTINUED | OUTPATIENT
Start: 2018-02-14 | End: 2018-02-14 | Stop reason: HOSPADM

## 2018-02-14 RX ORDER — TERBUTALINE SULFATE 1 MG/ML
0.25 INJECTION, SOLUTION SUBCUTANEOUS AS NEEDED
Status: DISCONTINUED | OUTPATIENT
Start: 2018-02-14 | End: 2018-02-16 | Stop reason: HOSPADM

## 2018-02-14 RX ORDER — DIPHENHYDRAMINE HYDROCHLORIDE 50 MG/ML
12.5 INJECTION INTRAMUSCULAR; INTRAVENOUS EVERY 8 HOURS PRN
Status: DISCONTINUED | OUTPATIENT
Start: 2018-02-14 | End: 2018-02-16 | Stop reason: HOSPADM

## 2018-02-14 RX ORDER — IBUPROFEN 600 MG/1
600 TABLET ORAL EVERY 8 HOURS PRN
Status: DISCONTINUED | OUTPATIENT
Start: 2018-02-14 | End: 2018-02-16 | Stop reason: HOSPADM

## 2018-02-14 RX ORDER — CEFAZOLIN SODIUM 2 G/100ML
2 INJECTION, SOLUTION INTRAVENOUS ONCE
Status: COMPLETED | OUTPATIENT
Start: 2018-02-14 | End: 2018-02-14

## 2018-02-14 RX ORDER — ONDANSETRON 2 MG/ML
4 INJECTION INTRAMUSCULAR; INTRAVENOUS ONCE AS NEEDED
Status: DISCONTINUED | OUTPATIENT
Start: 2018-02-14 | End: 2018-02-16 | Stop reason: HOSPADM

## 2018-02-14 RX ORDER — ONDANSETRON 4 MG/1
4 TABLET, FILM COATED ORAL EVERY 6 HOURS PRN
Status: DISCONTINUED | OUTPATIENT
Start: 2018-02-14 | End: 2018-02-16 | Stop reason: HOSPADM

## 2018-02-14 RX ORDER — OXYCODONE HYDROCHLORIDE AND ACETAMINOPHEN 5; 325 MG/1; MG/1
1 TABLET ORAL EVERY 4 HOURS PRN
Status: DISCONTINUED | OUTPATIENT
Start: 2018-02-14 | End: 2018-02-16 | Stop reason: HOSPADM

## 2018-02-14 RX ORDER — EPHEDRINE SULFATE 50 MG/ML
5 INJECTION, SOLUTION INTRAVENOUS AS NEEDED
Status: DISCONTINUED | OUTPATIENT
Start: 2018-02-14 | End: 2018-02-16 | Stop reason: HOSPADM

## 2018-02-14 RX ORDER — ONDANSETRON 2 MG/ML
4 INJECTION INTRAMUSCULAR; INTRAVENOUS EVERY 6 HOURS PRN
Status: DISCONTINUED | OUTPATIENT
Start: 2018-02-14 | End: 2018-02-16 | Stop reason: HOSPADM

## 2018-02-14 RX ORDER — METHYLERGONOVINE MALEATE 0.2 MG/ML
200 INJECTION INTRAVENOUS ONCE AS NEEDED
Status: DISCONTINUED | OUTPATIENT
Start: 2018-02-14 | End: 2018-02-14 | Stop reason: HOSPADM

## 2018-02-14 RX ORDER — ONDANSETRON 4 MG/1
4 TABLET, ORALLY DISINTEGRATING ORAL EVERY 6 HOURS PRN
Status: DISCONTINUED | OUTPATIENT
Start: 2018-02-14 | End: 2018-02-16 | Stop reason: HOSPADM

## 2018-02-14 RX ORDER — OXYTOCIN-SODIUM CHLORIDE 0.9% IV SOLN 30 UNIT/500ML 30-0.9/5 UT/ML-%
250 SOLUTION INTRAVENOUS CONTINUOUS
Status: DISPENSED | OUTPATIENT
Start: 2018-02-14 | End: 2018-02-14

## 2018-02-14 RX ORDER — DOCUSATE SODIUM 100 MG/1
100 CAPSULE, LIQUID FILLED ORAL 2 TIMES DAILY
Status: DISCONTINUED | OUTPATIENT
Start: 2018-02-14 | End: 2018-02-16 | Stop reason: HOSPADM

## 2018-02-14 RX ADMIN — SODIUM CHLORIDE, POTASSIUM CHLORIDE, SODIUM LACTATE AND CALCIUM CHLORIDE 999 ML/HR: 600; 310; 30; 20 INJECTION, SOLUTION INTRAVENOUS at 08:00

## 2018-02-14 RX ADMIN — Medication: at 18:28

## 2018-02-14 RX ADMIN — OXYTOCIN 125 ML/HR: 10 INJECTION INTRAVENOUS at 16:56

## 2018-02-14 RX ADMIN — Medication 8 ML/HR: at 08:30

## 2018-02-14 RX ADMIN — SODIUM CHLORIDE, POTASSIUM CHLORIDE, SODIUM LACTATE AND CALCIUM CHLORIDE 125 ML/HR: 600; 310; 30; 20 INJECTION, SOLUTION INTRAVENOUS at 08:30

## 2018-02-14 RX ADMIN — CEFAZOLIN SODIUM 2 G: 2 INJECTION, SOLUTION INTRAVENOUS at 16:38

## 2018-02-14 RX ADMIN — Medication: at 22:30

## 2018-02-14 RX ADMIN — OXYTOCIN 999 ML/HR: 10 INJECTION INTRAVENOUS at 15:28

## 2018-02-14 RX ADMIN — LIDOCAINE HYDROCHLORIDE AND EPINEPHRINE 5 ML: 15; 5 INJECTION, SOLUTION EPIDURAL at 08:26

## 2018-02-14 RX ADMIN — PRAMOXINE HYDROCHLORIDE AND HYDROCORTISONE ACETATE: 100; 100 AEROSOL, FOAM TOPICAL at 18:28

## 2018-02-14 RX ADMIN — IBUPROFEN 600 MG: 600 TABLET ORAL at 18:28

## 2018-02-14 RX ADMIN — DOCUSATE SODIUM 100 MG: 100 CAPSULE, LIQUID FILLED ORAL at 22:24

## 2018-02-14 NOTE — PLAN OF CARE
Problem: Patient Care Overview (Adult)  Goal: Plan of Care Review  Outcome: Ongoing (interventions implemented as appropriate)   18 1424   Coping/Psychosocial Response Interventions   Plan Of Care Reviewed With patient;spouse   Patient Care Overview   Progress improving   Outcome Evaluation   Outcome Summary/Follow up Plan pt is pushing and pushing effectivley, baby OP     Goal: Adult Individualization and Mutuality  Outcome: Ongoing (interventions implemented as appropriate)    Goal: Discharge Needs Assessment  Outcome: Ongoing (interventions implemented as appropriate)      Problem: Labor (Cervical Ripen, Induct, Augment) (Adult,Obstetrics,Pediatric)  Goal: Signs and Symptoms of Listed Potential Problems Will be Absent or Manageable (Labor)  Outcome: Ongoing (interventions implemented as appropriate)      Problem: Anesthesia/Analgesia, Neuraxial (Obstetrics)  Goal: Signs and Symptoms of Listed Potential Problems Will be Absent or Manageable (Anesthesia/Analgesia, Neuraxial)  Outcome: Ongoing (interventions implemented as appropriate)      Problem: Fall Risk  (Adult,Obstetrics,Pediatric)  Goal: Identify Related Risk Factors and Signs and Symptoms  Outcome: Ongoing (interventions implemented as appropriate)    Goal: Absence of Maternal Fall  Outcome: Ongoing (interventions implemented as appropriate)    Goal: Absence of  Fall/Drop  Outcome: Ongoing (interventions implemented as appropriate)

## 2018-02-14 NOTE — H&P
[facility]  Obstetric History and Physical    Chief Complaint   Patient presents with   • Contractions     started at 0230. Seen in office yesterday and was 3cm.  No bleeding or leaking of fluid       Subjective     Patient is a 27 y.o. female  currently at 39w3d, who presents with labor. She notes active fetal movement and denies vaginal bleeding or leaking fluid. She denies headache, swelling or abdominal pain. She denies history of HTN accept during episodes of anxiety. She reports she feels well but was anxious and uncomfortable on arrival. She denies soa or chest pain.    Her prenatal care is complicated by .  Patient Active Problem List   Diagnosis   • Anxiety   • Spontaneous miscarriage   • Prenatal care of multigravida, antepartum   • Anemia affecting pregnancy, antepartum   • Pregnancy   • Hypertension affecting pregnancy, antepartum     Her previous obstetric/gynecological history is noted for prior term vaginal delivery. Prior delivery note reviewed.    The following portions of the patients history were reviewed and updated as appropriate: current medications, allergies, past medical history, past surgical history, past family history, past social history and problem list .       Prenatal Information:  Prenatal Results         Initial Prenatal Labs Ref. Range Date Time   Hemoglobin  13.3 g/dL 11.1 - 15.9 g/dL 06/15/17 1000   Hematocrit  39.1 % 34.0 - 46.6 % 06/15/17 1000   Platelets  133 10*3/mm3 (L) 140 - 500 10*3/mm3 18 0727   Rubella IgG  1.32 index Immune >0.99 index 06/15/17 1000   Hepatitis B SAg  Negative  Negative 06/15/17 1000   Hepatitis C Ab  <0.1 s/co ratio 0.0 - 0.9 s/co ratio 06/15/17 1000   RPR  Non Reactive  Non Reactive 06/15/17 1000   ABO  A   18 0727   Rh  Positive   18 0727   Antibody Screen  Negative  Negative 06/15/17 1000   HIV  Non Reactive  Non Reactive 06/15/17 1000   Urine Culture  Final report   06/15/17 1026   Gonorrhea       Chlamydia       TSH        2nd and 3rd Trimester Ref. Range Date Time   Hemoglobin (repeated)  12.9 g/dL 11.9 - 15.5 g/dL 18 0727   Hematocrit (repeated)  37.7 % 35.6 - 45.5 % 18 0727   GCT  98 mg/dL 65 - 139 mg/dL 11/15/17 1046   Antibody Screen (repeated)  Negative   18 0727   GTT Fasting       GTT 1 Hr       GTT 2 Hr       GTT 3 Hr       Group B Strep  Negative  Negative 18 1133   Drug Screening Ref. Range Date Time   Amphetamine Screen       Barbiturate Screen       Benzodiazepine Screen       Methadone Screen       Phencyclidine Screen       Opiates Screen       THC Screen       Cocaine Screen       Propoxyphene Screen       Buprenorphine Screen       Methamphetamine Screen       Oxycodone Screen       Tryicyclic Antidepressants Screen       Other (Risk screening) Ref. Range Date Time   Varicella IgG  3849 index Immune >165 index 06/15/17 1000   Parvovirus IgG       CMV IgG       Cystic Fibrosis       Hemoglobin electrophoresis       NIPT       MSAFP-4       AFP (for NTD only)              Legend: ^: Historical            View all results for this pregnancy             Past OB History:     Obstetric History       T1      L1     SAB1   TAB0   Ectopic0   Multiple0   Live Births1       # Outcome Date GA Lbr Ian/2nd Weight Sex Delivery Anes PTL Lv   3 Current            2 SAB 17    U    FD   1 Term 08/27/15   3629 g (8 lb) F Vag-Spont  N NEIL      Name: Mikael          Past Medical History: Past Medical History:   Diagnosis Date   • Anxiety    • Vaginal delivery       Past Surgical History Past Surgical History:   Procedure Laterality Date   • WISDOM TOOTH EXTRACTION        Family History: History reviewed. No pertinent family history.   Social History:  reports that she has never smoked. She has never used smokeless tobacco.   reports that she does not drink alcohol.   reports that she does not use illicit drugs.        General ROS: Pertinent items are noted in HPI    Objective       Vital  Signs Range for the last 24 hours  Temperature: Temp:  [98.1 °F (36.7 °C)] 98.1 °F (36.7 °C)   Temp Source: Temp src: Oral   BP: BP: (124-183)/() 137/76   Pulse: Heart Rate:  [] 86   Respirations: Resp:  [18] 18   SPO2: SpO2:  [88 %-99 %] 88 %   O2 Amount (l/min):     O2 Devices     Weight: Weight:  [79.8 kg (176 lb)-80.7 kg (178 lb)] 80.7 kg (178 lb)     Physical Examination: General appearance - alert, well appearing, and in no distress  Mental status - alert, oriented to person, place, and time  Neck - supple, no significant adenopathy  Chest - clear to auscultation, no wheezes, rales or rhonchi, symmetric air entry  Heart - normal rate and regular rhythm  Abdomen - gravid, soft  Neurological - alert, oriented, normal speech, no focal findings or movement disorder noted, DTR's normal and symmetric  Extremities - extremities without edema or tenderness    Presentation: vtx   Cervix: Exam by: Method: sterile exam per physician   Dilation: Dilation: 8   Effacement: Cervical Effacement: 100%   Station: Station: 0     AROM performed after verbal consent from pt, clear fluid noted    Fetal Heart Rate Assessment   Method: Fetal HR Assessment Method: external   Beats/min: Fetal HR (Beats/Min): 130   Baseline: Fetal HR Baseline: normal range (110-160 bpm)   Varibility: Fetal HR Variability: moderate (amplitude range 6 to 25 bpm)   Accels: Fetal HR Accelerations: lasting at least 15 seconds, greater than/equal to 15 bpm   Decels: Fetal HR Decelerations: absent   Tracing Category:       Uterine Assessment   q 1 to 3 min                                      Laboratory Results:   Lab Results   Component Value Date    WBC 14.26 (H) 02/14/2018    HGB 12.9 02/14/2018    HCT 37.7 02/14/2018    MCV 88.1 02/14/2018     (L) 02/14/2018       Results from last 7 days  Lab Units 02/14/18  0727   ABO TYPING  A   RH TYPING  Positive     Lab Results   Component Value Date    GLUCOSE 90 02/14/2018    BUN 6 02/14/2018     CREATININE 0.59 02/14/2018    EGFRIFNONA 124 02/14/2018    BCR 10.2 02/14/2018    K 3.6 02/14/2018    CO2 25.3 02/14/2018    CALCIUM 8.5 (L) 02/14/2018    ALBUMIN 3.50 02/14/2018    LABIL2 1.0 02/14/2018    AST 22 02/14/2018    ALT 16 02/14/2018       Assessment/Plan     Active Problems:    Pregnancy    Hypertension affecting pregnancy, antepartum        Assessment:  1.  Intrauterine pregnancy at 39w3d weeks gestation with reactive fetal status.    2.  Pt presents for labor, progressing well, AROM per pt request with clear fluid; HTN on arrival. Urine dip by verbal report from admitting RN was negative for protein. Pt asymptomatic and notes elevated BP with anxiety in past. Platelets minimally decreased but AST/ALT normal. Will follow. Levels improving with pain control.  3.  Obstetrical history reviewed  4.  GBS status: negative  Plan:  1. Observe for ongoing progress and follow BP, current values are mildly elevated  2. Plan of care has been reviewed with patient and partner  3.  Risks, benefits of treatment plan have been discussed.  4.  All questions have been answered.      Maryanne Hunter MD  2/14/2018  9:20 AM

## 2018-02-14 NOTE — PLAN OF CARE
Problem: Patient Care Overview (Adult)  Goal: Plan of Care Review  Outcome: Ongoing (interventions implemented as appropriate)   18   Coping/Psychosocial Response Interventions   Plan Of Care Reviewed With patient;spouse   Patient Care Overview   Progress improving     Goal: Adult Individualization and Mutuality  Outcome: Ongoing (interventions implemented as appropriate)   18 1424 18   Individualization   Patient Specific Goals vaginal, painfree delivery --    Mutuality/Individual Preferences   What Questions Do You Have About Your Health or Care? --  when can I eat   What Information Would Help Us Give You More Personalized Care? keep me informed of plan, how baby is doing,  --      Goal: Discharge Needs Assessment  Outcome: Ongoing (interventions implemented as appropriate)   18   Discharge Needs Assessment   Concerns To Be Addressed no discharge needs identified       Problem: Anesthesia/Analgesia, Neuraxial (Obstetrics)  Goal: Signs and Symptoms of Listed Potential Problems Will be Absent or Manageable (Anesthesia/Analgesia, Neuraxial)  Outcome: Ongoing (interventions implemented as appropriate)   18   Anesthesia/Analgesia, Neuraxial   Problems Assessed (Neuraxial Anesthesia/Analgesia, OB) all   Problems Present (Neuraxial Anesthesia/Analgesia, OB) none       Problem: Fall Risk  (Adult,Obstetrics,Pediatric)  Goal: Identify Related Risk Factors and Signs and Symptoms  Outcome: Ongoing (interventions implemented as appropriate)   18   Fall Risk    Fall Risk: Related Risk Factors balance change;pregnancy weight gain;prolonged bed rest;regional anesthesia    Fall Risk: Signs and Symptoms presence of fall risk factors     Goal: Absence of Maternal Fall  Outcome: Ongoing (interventions implemented as appropriate)   18   Fall Risk  (Adult,Obstetrics,Pediatric)   Absence of Maternal Fall achieves outcome      Goal: Absence of [place] Fall/Drop  Outcome: Ongoing (interventions implemented as appropriate)   18 1759   Fall Risk  (Adult,Obstetrics,Pediatric)   Absence of [place] Fall/Drop achieves outcome       Problem: Postpartum, Vaginal Delivery (Adult)  Goal: Signs and Symptoms of Listed Potential Problems Will be Absent or Manageable (Postpartum, Vaginal Delivery)  Outcome: Ongoing (interventions implemented as appropriate)   18   Postpartum, Vaginal Delivery   Problems Assessed (Postpartum Vaginal Delivery) all   Problems Present (Postpartum Vaginal Delivery) none

## 2018-02-14 NOTE — PLAN OF CARE
Problem: Labor (Cervical Ripen, Induct, Augment) (Adult,Obstetrics,Pediatric)  Goal: Signs and Symptoms of Listed Potential Problems Will be Absent or Manageable (Labor)  Outcome: Outcome(s) achieved Date Met: 02/14/18 02/14/18 1613   Labor (Cervical Ripen, Induct, Augment)   Problems Assessed (Labor) all   Problems Present (Labor) pain

## 2018-02-14 NOTE — ANESTHESIA PREPROCEDURE EVALUATION
Anesthesia Evaluation     Patient summary reviewed and Nursing notes reviewed                Airway   Mallampati: II  TM distance: >3 FB  Neck ROM: full  Dental - normal exam     Pulmonary - negative pulmonary ROS and normal exam   Cardiovascular - negative cardio ROS and normal exam  Exercise tolerance: good (4-7 METS)        Neuro/Psych  (+) psychiatric history Anxiety,     GI/Hepatic/Renal/Endo - negative ROS     Musculoskeletal (-) negative ROS    Abdominal  - normal exam    Bowel sounds: normal.   Substance History - negative use     OB/GYN    (+) Pregnant,         Other                        Anesthesia Plan    ASA 2     epidural     Anesthetic plan and risks discussed with patient.

## 2018-02-14 NOTE — L&D DELIVERY NOTE
[facility]  Vaginal Delivery Note    Delivery     Delivery: Vaginal, Spontaneous Delivery     YOB: 2016    Time of Birth: 3:24 PM      Anesthesia: Epidural     Delivering clinician: Maryanne Hunter    Forceps?   No   Vacuum? No    Shoulder dystocia present: No        Delivery narrative:  27 year old G 3 P 1011 at 39 weeks presented with labor. She had AROM with clear fluid after admission. She had epidural placed for pain control. She initially had quick progress in labor but protracted progress was noted from 8 cm to complete dilation. When patient began pushing, she had slower progress than with prior delivery and position was noted to be OP. Attempted manual rotation after verbal consent but this was not successful. Patient then decided to push and made continuous progress pushing approximately 1.5 hours to spontaneous vaginal delivery of viable male infant direct OP. Shoulders and body delivered easily and infant was placed on mother's chest for kangaroo care. After 30 seconds, the umbilical cord was clamped and cut. Placenta delivered spontaneously intact. Manual exploration of uterus revealed no retained products. A partial 3rd degree laceration was noted. The rectal sphincter was repaired with figure-of-eight sutures of 0.0 and 3.0 vicryl. The remainder of the laceration was repaired with 3-0 and 4-0 vicryl suture. The cervix was intact. Juan catheter was replaced with sterile technique and all counts were correct. Rectal examination was performed following repair and no stitches or defects were noted.     Infant    Findings: male  infant     Infant observations: Weight: No birth weight on file.   Length:    in  Observations/Comments:  scale 3      Apgars: 8   @ 1 minute /    9   @ 5 minutes         Placenta, Cord, and Fluid    Placenta delivered  Expressed  at   2/14  3:27 PM     Cord: 3 vessels  present.   Nuchal Cord?  no   Cord blood obtained: Yes    Cord gases obtained:  No               Repair    Episiotomy: None    Lacerations: Yes  Laceration Information  Laceration Repaired?   Perineal: 3rd  Yes    Periurethral:         Labial:         Sulcus:         Vaginal: No       Cervical: No           Estimated Blood Loss: Est. Blood Loss (mL): 300 mL (Filed from Delivery Summary) (02/14/18 8556)     Suture used for repair: 0 Vicryl and 3-0 Vicryl and 4-0 vicryl     Complications  none    Disposition  Mother to Mother Baby/Postpartum  in stable condition currently.  Baby to NBN  in stable condition currently.      Maryanne Hunter MD  02/14/18  4:11 PM

## 2018-02-14 NOTE — ANESTHESIA PROCEDURE NOTES
Labor Epidural    Patient location during procedure: OB  Start Time: 2/14/2018 8:20 AM  Stop Time: 2/14/2018 8:37 AM  Performed By  Anesthesiologist: JUSTIN COPELAND  Preanesthetic Checklist  Completed: patient identified, site marked, surgical consent, pre-op evaluation, timeout performed, IV checked, risks and benefits discussed and monitors and equipment checked  Prep:  Pt Position:sitting  Sterile Tech:cap, gloves, mask and sterile barrier  Prep:povidone-iodine 7.5% surgical scrub  Monitoring:blood pressure monitoring, continuous pulse oximetry and EKG  Epidural Block Procedure:  Approach:midline  Guidance:landmark technique and palpation technique  Location:L4-L5  Needle Type:Tuohy  Needle Gauge:17  Loss of Resistance Medium: air  Paresthesia: none  Aspiration:negative  Test Dose:negative  Number of Attempts: 1  Post Assessment:  Dressing:occlusive dressing applied and secured with tape  Pt Tolerance:patient tolerated the procedure well with no apparent complications  Complications:no

## 2018-02-15 LAB
BASOPHILS # BLD AUTO: 0.03 10*3/MM3 (ref 0–0.2)
BASOPHILS NFR BLD AUTO: 0.2 % (ref 0–1.5)
DEPRECATED RDW RBC AUTO: 43.2 FL (ref 37–54)
EOSINOPHIL # BLD AUTO: 0.1 10*3/MM3 (ref 0–0.7)
EOSINOPHIL NFR BLD AUTO: 0.7 % (ref 0.3–6.2)
ERYTHROCYTE [DISTWIDTH] IN BLOOD BY AUTOMATED COUNT: 13.2 % (ref 11.7–13)
HCT VFR BLD AUTO: 32.9 % (ref 35.6–45.5)
HGB BLD-MCNC: 10.7 G/DL (ref 11.9–15.5)
IMM GRANULOCYTES # BLD: 0.05 10*3/MM3 (ref 0–0.03)
IMM GRANULOCYTES NFR BLD: 0.3 % (ref 0–0.5)
LYMPHOCYTES # BLD AUTO: 2 10*3/MM3 (ref 0.9–4.8)
LYMPHOCYTES NFR BLD AUTO: 13.2 % (ref 19.6–45.3)
MCH RBC QN AUTO: 29.4 PG (ref 26.9–32)
MCHC RBC AUTO-ENTMCNC: 32.5 G/DL (ref 32.4–36.3)
MCV RBC AUTO: 90.4 FL (ref 80.5–98.2)
MONOCYTES # BLD AUTO: 0.92 10*3/MM3 (ref 0.2–1.2)
MONOCYTES NFR BLD AUTO: 6.1 % (ref 5–12)
NEUTROPHILS # BLD AUTO: 12.1 10*3/MM3 (ref 1.9–8.1)
NEUTROPHILS NFR BLD AUTO: 79.5 % (ref 42.7–76)
PLATELET # BLD AUTO: 126 10*3/MM3 (ref 140–500)
PMV BLD AUTO: 11.8 FL (ref 6–12)
RBC # BLD AUTO: 3.64 10*6/MM3 (ref 3.9–5.2)
WBC NRBC COR # BLD: 15.2 10*3/MM3 (ref 4.5–10.7)

## 2018-02-15 PROCEDURE — 85025 COMPLETE CBC W/AUTO DIFF WBC: CPT | Performed by: OBSTETRICS & GYNECOLOGY

## 2018-02-15 RX ADMIN — IBUPROFEN 600 MG: 600 TABLET ORAL at 22:30

## 2018-02-15 RX ADMIN — IBUPROFEN 600 MG: 600 TABLET ORAL at 06:36

## 2018-02-15 RX ADMIN — DOCUSATE SODIUM 100 MG: 100 CAPSULE, LIQUID FILLED ORAL at 10:45

## 2018-02-15 RX ADMIN — IBUPROFEN 600 MG: 600 TABLET ORAL at 14:21

## 2018-02-15 NOTE — LACTATION NOTE
This note was copied from a baby's chart.  P2 term baby who is nursing well per Mom. She nursed her first x 1yr and denies any concerns at present. Encouraged to call for any needs.

## 2018-02-15 NOTE — PLAN OF CARE
Problem: Patient Care Overview (Adult)  Goal: Plan of Care Review  Outcome: Ongoing (interventions implemented as appropriate)   02/14/18 8137   Coping/Psychosocial Response Interventions   Plan Of Care Reviewed With patient;spouse   Patient Care Overview   Progress improving   Outcome Evaluation   Outcome Summary/Follow up Plan vss, head to toe wnl, breastfeeding fair, up x1, due to void, pain controlled with motrin

## 2018-02-15 NOTE — PROGRESS NOTES
"Postpartum Vaginal Delivery Note PPD #1     CC: post vaginal delivery    Subjective:  Pain well controlled. Lochia normal. Ambulating well. Tolerating regular diet. Voiding without difficulty. No complaints.    ROS: No fever/chills. No N/V. No leg pain.    Vitals:   /94 (BP Location: Right arm, Patient Position: Lying)  Pulse 90  Temp 98.4 °F (36.9 °C) (Oral)   Resp 16  Ht 175.3 cm (69\")  Wt 80.7 kg (178 lb)  LMP 04/10/2017  SpO2 (!) 88%  Breastfeeding? Yes  BMI 26.29 kg/m2        Exam:   Gen: NAD, cooperative, conversive  Resp: Nonlabored  Abd: Soft, nondistended, fundus is firm below umbillicus, nontender  CV: Trace LE edema  Ext: Nontender bilaterally  Labs:  Recent Results (from the past 36 hour(s))   Type & Screen    Collection Time: 02/14/18  7:27 AM   Result Value Ref Range    ABO Type A     RH type Positive     Antibody Screen Negative    Comprehensive Metabolic Panel    Collection Time: 02/14/18  7:27 AM   Result Value Ref Range    Glucose 90 65 - 99 mg/dL    BUN 6 6 - 20 mg/dL    Creatinine 0.59 0.57 - 1.00 mg/dL    Sodium 133 (L) 136 - 145 mmol/L    Potassium 3.6 3.5 - 5.2 mmol/L    Chloride 97 (L) 98 - 107 mmol/L    CO2 25.3 22.0 - 29.0 mmol/L    Calcium 8.5 (L) 8.6 - 10.5 mg/dL    Total Protein 6.9 6.0 - 8.5 g/dL    Albumin 3.50 3.50 - 5.20 g/dL    ALT (SGPT) 16 1 - 33 U/L    AST (SGOT) 22 1 - 32 U/L    Alkaline Phosphatase 99 39 - 117 U/L    Total Bilirubin 0.3 0.1 - 1.2 mg/dL    eGFR Non African Amer 124 >60 mL/min/1.73    Globulin 3.4 gm/dL    A/G Ratio 1.0 g/dL    BUN/Creatinine Ratio 10.2 7.0 - 25.0    Anion Gap 10.7 mmol/L   CBC Auto Differential    Collection Time: 02/14/18  7:27 AM   Result Value Ref Range    WBC 14.26 (H) 4.50 - 10.70 10*3/mm3    RBC 4.28 3.90 - 5.20 10*6/mm3    Hemoglobin 12.9 11.9 - 15.5 g/dL    Hematocrit 37.7 35.6 - 45.5 %    MCV 88.1 80.5 - 98.2 fL    MCH 30.1 26.9 - 32.0 pg    MCHC 34.2 32.4 - 36.3 g/dL    RDW 13.2 (H) 11.7 - 13.0 %    RDW-SD 42.4 37.0 - " 54.0 fl    MPV 12.0 6.0 - 12.0 fL    Platelets 133 (L) 140 - 500 10*3/mm3    Neutrophil % 79.4 (H) 42.7 - 76.0 %    Lymphocyte % 13.2 (L) 19.6 - 45.3 %    Monocyte % 6.2 5.0 - 12.0 %    Eosinophil % 0.6 0.3 - 6.2 %    Basophil % 0.1 0.0 - 1.5 %    Immature Grans % 0.5 0.0 - 0.5 %    Neutrophils, Absolute 11.31 (H) 1.90 - 8.10 10*3/mm3    Lymphocytes, Absolute 1.88 0.90 - 4.80 10*3/mm3    Monocytes, Absolute 0.89 0.20 - 1.20 10*3/mm3    Eosinophils, Absolute 0.09 0.00 - 0.70 10*3/mm3    Basophils, Absolute 0.02 0.00 - 0.20 10*3/mm3    Immature Grans, Absolute 0.07 (H) 0.00 - 0.03 10*3/mm3   CBC Auto Differential    Collection Time: 02/15/18  6:33 AM   Result Value Ref Range    WBC 15.20 (H) 4.50 - 10.70 10*3/mm3    RBC 3.64 (L) 3.90 - 5.20 10*6/mm3    Hemoglobin 10.7 (L) 11.9 - 15.5 g/dL    Hematocrit 32.9 (L) 35.6 - 45.5 %    MCV 90.4 80.5 - 98.2 fL    MCH 29.4 26.9 - 32.0 pg    MCHC 32.5 32.4 - 36.3 g/dL    RDW 13.2 (H) 11.7 - 13.0 %    RDW-SD 43.2 37.0 - 54.0 fl    MPV 11.8 6.0 - 12.0 fL    Platelets 126 (L) 140 - 500 10*3/mm3    Neutrophil % 79.5 (H) 42.7 - 76.0 %    Lymphocyte % 13.2 (L) 19.6 - 45.3 %    Monocyte % 6.1 5.0 - 12.0 %    Eosinophil % 0.7 0.3 - 6.2 %    Basophil % 0.2 0.0 - 1.5 %    Immature Grans % 0.3 0.0 - 0.5 %    Neutrophils, Absolute 12.10 (H) 1.90 - 8.10 10*3/mm3    Lymphocytes, Absolute 2.00 0.90 - 4.80 10*3/mm3    Monocytes, Absolute 0.92 0.20 - 1.20 10*3/mm3    Eosinophils, Absolute 0.10 0.00 - 0.70 10*3/mm3    Basophils, Absolute 0.03 0.00 - 0.20 10*3/mm3    Immature Grans, Absolute 0.05 (H) 0.00 - 0.03 10*3/mm3       Patient Active Problem List   Diagnosis   • Anxiety   • Spontaneous miscarriage   • Prenatal care of multigravida, antepartum   • Anemia affecting pregnancy, antepartum   • Pregnancy   • Hypertension affecting pregnancy, antepartum   • Vaginal delivery       Assessment and Plan:  Iraida Solo is a 27 y.o. female  s/p   1. Elevated blood pressure in the 140s  over 90s.  Platelet count is slightly low at 126 and lower than it was from admission.  Liver enzymes were normal.  No symptoms.  Start labetalol 100 mg twice a day.  2. Continue medication prn for pain  3. Ambulation encouraged.  4.  Patient desires circumcision for her son.  We discussed the elective nature of circumcision, risk of infection, risk of bleeding and risk of injury to the penis.  Patient wishes to proceed.         Signed By:  Cortez Srinivasan MD       February 15, 2018 9:53 AM

## 2018-02-15 NOTE — PLAN OF CARE
Problem: Postpartum, Vaginal Delivery (Adult)  Intervention: Support Life/Role Transition   02/14/18 2125   Coping Strategies   Trust Relationship/Rapport care explained;choices provided;emotional support provided;empathic listening provided;questions answered;questions encouraged;reassurance provided;thoughts/feelings acknowledged   Coping/Psychosocial Interventions   Parent/Child Attachment Promotion attachment promoted;positive reinforcement provided;strengths emphasized;face-to-face positioning promoted;parent-child separation minimized;role responsibility promoted;rooming-in promoted;skin-to-skin contact encouraged   Environmental Support calm environment promoted;distractions minimized;environmental consistency promoted;personal routine supported;rooming-in facilitated;rest periods encouraged     Intervention: Minimize/Manage Infection Risk   02/14/18 2125   Hygiene Care Assistance   Perineal Care cleansed;absorbent pad changed;ice pack   Genitourinary () Interventions   Urinary Elimination Promotion frequent voiding encouraged       Goal: Signs and Symptoms of Listed Potential Problems Will be Absent or Manageable (Postpartum, Vaginal Delivery)  Outcome: Ongoing (interventions implemented as appropriate)   02/14/18 2310   Postpartum, Vaginal Delivery   Problems Assessed (Postpartum Vaginal Delivery) all   Problems Present (Postpartum Vaginal Delivery) none

## 2018-02-16 VITALS
WEIGHT: 178 LBS | HEIGHT: 69 IN | OXYGEN SATURATION: 88 % | SYSTOLIC BLOOD PRESSURE: 118 MMHG | HEART RATE: 78 BPM | BODY MASS INDEX: 26.36 KG/M2 | RESPIRATION RATE: 16 BRPM | DIASTOLIC BLOOD PRESSURE: 80 MMHG | TEMPERATURE: 97.7 F

## 2018-02-16 LAB
ALBUMIN SERPL-MCNC: 3.1 G/DL (ref 3.5–5.2)
ALBUMIN/GLOB SERPL: 1.2 G/DL
ALP SERPL-CCNC: 68 U/L (ref 39–117)
ALT SERPL W P-5'-P-CCNC: 12 U/L (ref 1–33)
ANION GAP SERPL CALCULATED.3IONS-SCNC: 9.6 MMOL/L
AST SERPL-CCNC: 23 U/L (ref 1–32)
BASOPHILS # BLD AUTO: 0.03 10*3/MM3 (ref 0–0.2)
BASOPHILS NFR BLD AUTO: 0.3 % (ref 0–1.5)
BILIRUB SERPL-MCNC: 0.2 MG/DL (ref 0.1–1.2)
BUN BLD-MCNC: 6 MG/DL (ref 6–20)
BUN/CREAT SERPL: 10.2 (ref 7–25)
CALCIUM SPEC-SCNC: 8.4 MG/DL (ref 8.6–10.5)
CHLORIDE SERPL-SCNC: 103 MMOL/L (ref 98–107)
CO2 SERPL-SCNC: 25.4 MMOL/L (ref 22–29)
CREAT BLD-MCNC: 0.59 MG/DL (ref 0.57–1)
DEPRECATED RDW RBC AUTO: 43.8 FL (ref 37–54)
EOSINOPHIL # BLD AUTO: 0.18 10*3/MM3 (ref 0–0.7)
EOSINOPHIL NFR BLD AUTO: 1.7 % (ref 0.3–6.2)
ERYTHROCYTE [DISTWIDTH] IN BLOOD BY AUTOMATED COUNT: 13.3 % (ref 11.7–13)
GFR SERPL CREATININE-BSD FRML MDRD: 124 ML/MIN/1.73
GLOBULIN UR ELPH-MCNC: 2.6 GM/DL
GLUCOSE BLD-MCNC: 76 MG/DL (ref 65–99)
HCT VFR BLD AUTO: 33 % (ref 35.6–45.5)
HGB BLD-MCNC: 10.8 G/DL (ref 11.9–15.5)
IMM GRANULOCYTES # BLD: 0.05 10*3/MM3 (ref 0–0.03)
IMM GRANULOCYTES NFR BLD: 0.5 % (ref 0–0.5)
LYMPHOCYTES # BLD AUTO: 2.75 10*3/MM3 (ref 0.9–4.8)
LYMPHOCYTES NFR BLD AUTO: 26.6 % (ref 19.6–45.3)
MCH RBC QN AUTO: 29.6 PG (ref 26.9–32)
MCHC RBC AUTO-ENTMCNC: 32.7 G/DL (ref 32.4–36.3)
MCV RBC AUTO: 90.4 FL (ref 80.5–98.2)
MONOCYTES # BLD AUTO: 0.66 10*3/MM3 (ref 0.2–1.2)
MONOCYTES NFR BLD AUTO: 6.4 % (ref 5–12)
NEUTROPHILS # BLD AUTO: 6.68 10*3/MM3 (ref 1.9–8.1)
NEUTROPHILS NFR BLD AUTO: 64.5 % (ref 42.7–76)
PLATELET # BLD AUTO: 125 10*3/MM3 (ref 140–500)
PMV BLD AUTO: 11.4 FL (ref 6–12)
POTASSIUM BLD-SCNC: 3.6 MMOL/L (ref 3.5–5.2)
PROT SERPL-MCNC: 5.7 G/DL (ref 6–8.5)
RBC # BLD AUTO: 3.65 10*6/MM3 (ref 3.9–5.2)
SODIUM BLD-SCNC: 138 MMOL/L (ref 136–145)
WBC NRBC COR # BLD: 10.35 10*3/MM3 (ref 4.5–10.7)

## 2018-02-16 PROCEDURE — 80053 COMPREHEN METABOLIC PANEL: CPT | Performed by: OBSTETRICS & GYNECOLOGY

## 2018-02-16 PROCEDURE — 85025 COMPLETE CBC W/AUTO DIFF WBC: CPT | Performed by: OBSTETRICS & GYNECOLOGY

## 2018-02-16 RX ORDER — OXYCODONE HYDROCHLORIDE AND ACETAMINOPHEN 5; 325 MG/1; MG/1
1 TABLET ORAL EVERY 4 HOURS PRN
Qty: 20 TABLET | Refills: 0 | Status: SHIPPED | OUTPATIENT
Start: 2018-02-16 | End: 2018-02-24

## 2018-02-16 RX ORDER — IBUPROFEN 600 MG/1
600 TABLET ORAL EVERY 8 HOURS PRN
Qty: 30 TABLET | Refills: 1 | Status: SHIPPED | OUTPATIENT
Start: 2018-02-16 | End: 2018-11-08

## 2018-02-16 RX ADMIN — IBUPROFEN 600 MG: 600 TABLET ORAL at 06:46

## 2018-02-16 RX ADMIN — PRAMOXINE HYDROCHLORIDE AND HYDROCORTISONE ACETATE: 100; 100 AEROSOL, FOAM TOPICAL at 10:18

## 2018-02-16 RX ADMIN — DOCUSATE SODIUM 100 MG: 100 CAPSULE, LIQUID FILLED ORAL at 07:46

## 2018-02-16 NOTE — LACTATION NOTE
This note was copied from a baby's chart.  Lc observed Baby Raul latched to left breast with deep , nutritive suckle . Breast are filling and mom has pump for home use. Denies nipple pain and feels breastfeeding is going well. Has card for [facility]

## 2018-02-16 NOTE — PROGRESS NOTES
"Postpartum Vaginal Delivery Note PPD# 2    CC:  PPD 2    Assessment:     Pt doing well. Pain well controlled. Lochia normal. Ambulating well. Tolerating regular diet. Voiding without difficulty. No complaints.  No headaches.    Review of Systems - Negative except pain at laceration site- appropriate and pain meds help    Vitals:   /80 (BP Location: Right arm, Patient Position: Lying)  Pulse 78  Temp 97.7 °F (36.5 °C) (Oral)   Resp 16  Ht 175.3 cm (69\")  Wt 80.7 kg (178 lb)  LMP 04/10/2017  SpO2 (!) 88%  Breastfeeding? Yes  BMI 26.29 kg/m2      Exam:   Neck:  No LAD or TM  Lungs:  Non labored breathing, no tachypnea  Gen: NAD, cooperative, conversive  Abd: Soft, nondistended, fundus is firm below umbillicus, approp tender  Ext: Nontender bilaterally, edema-     Labs:  Lab Results (last 24 hours)     Procedure Component Value Units Date/Time    CBC & Differential [[id number]] Collected:  02/16/18 0520    Specimen:  Blood Updated:  02/16/18 0622    Narrative:       The following orders were created for panel order CBC & Differential.  Procedure                               Abnormality         Status                     ---------                               -----------         ------                     CBC Auto Differential[[id number]]        Abnormal            Final result                 Please view results for these tests on the individual orders.    CBC Auto Differential [[id number]]  (Abnormal) Collected:  02/16/18 0520    Specimen:  Blood Updated:  02/16/18 0622     WBC 10.35 10*3/mm3      RBC 3.65 (L) 10*6/mm3      Hemoglobin 10.8 (L) g/dL      Hematocrit 33.0 (L) %      MCV 90.4 fL      MCH 29.6 pg      MCHC 32.7 g/dL      RDW 13.3 (H) %      RDW-SD 43.8 fl      MPV 11.4 fL      Platelets 125 (L) 10*3/mm3      Neutrophil % 64.5 %      Lymphocyte % 26.6 %      Monocyte % 6.4 %      Eosinophil % 1.7 %      Basophil % 0.3 %      Immature Grans % 0.5 %      Neutrophils, Absolute 6.68 10*3/mm3      " Lymphocytes, Absolute 2.75 10*3/mm3      Monocytes, Absolute 0.66 10*3/mm3      Eosinophils, Absolute 0.18 10*3/mm3      Basophils, Absolute 0.03 10*3/mm3      Immature Grans, Absolute 0.05 (H) 10*3/mm3     Comprehensive Metabolic Panel [[id number]]  (Abnormal) Collected:  18 0520    Specimen:  Blood Updated:  18 0640     Glucose 76 mg/dL      BUN 6 mg/dL      Creatinine 0.59 mg/dL      Sodium 138 mmol/L      Potassium 3.6 mmol/L      Chloride 103 mmol/L      CO2 25.4 mmol/L      Calcium 8.4 (L) mg/dL      Total Protein 5.7 (L) g/dL      Albumin 3.10 (L) g/dL      ALT (SGPT) 12 U/L      AST (SGOT) 23 U/L      Alkaline Phosphatase 68 U/L      Total Bilirubin 0.2 mg/dL      eGFR Non African Amer 124 mL/min/1.73      Globulin 2.6 gm/dL      A/G Ratio 1.2 g/dL      BUN/Creatinine Ratio 10.2     Anion Gap 9.6 mmol/L           Assessment:  PPD 2 s/p vaginal delivery  Patient Active Problem List   Diagnosis   • Anxiety   • Spontaneous miscarriage   • Prenatal care of multigravida, antepartum   • Anemia affecting pregnancy, antepartum   • Pregnancy   • Hypertension affecting pregnancy, antepartum   • Vaginal delivery       Plan: Iraida Solo is a 27 y.o. female  s/p  Vaginal delivery- PPD 2  BPs improved    1. Doing well, d/c home today  2. Precautions given  3. RTC in 1 weeks for BP check  4. Ambulation encouraged.         Signed By:  Nava Mayo MD       2018 9:27 AM

## 2018-02-16 NOTE — PLAN OF CARE
Problem: Postpartum, Vaginal Delivery (Adult)  Intervention: Support Life/Role Transition   02/15/18 2105   Coping Strategies   Trust Relationship/Rapport care explained;choices provided;emotional support provided;empathic listening provided;questions answered;questions encouraged;reassurance provided;thoughts/feelings acknowledged   Coping/Psychosocial Interventions   Parent/Child Attachment Promotion attachment promoted;positive reinforcement provided;strengths emphasized;face-to-face positioning promoted;role responsibility promoted;parent-child separation minimized;rooming-in promoted;skin-to-skin contact encouraged   Environmental Support calm environment promoted;distractions minimized;environmental consistency promoted;personal routine supported;rest periods encouraged;rooming-in facilitated     Intervention: Minimize/Manage Infection Risk   02/15/18 2105   Hygiene Care Assistance   Perineal Care cleansed;absorbent pad changed;tucks/witch hazel pads   Genitourinary () Interventions   Urinary Elimination Promotion frequent voiding encouraged       Goal: Signs and Symptoms of Listed Potential Problems Will be Absent or Manageable (Postpartum, Vaginal Delivery)  Outcome: Ongoing (interventions implemented as appropriate)   02/16/18 0207   Postpartum, Vaginal Delivery   Problems Assessed (Postpartum Vaginal Delivery) all   Problems Present (Postpartum Vaginal Delivery) none

## 2018-02-16 NOTE — PLAN OF CARE
Problem: Patient Care Overview (Adult)  Goal: Plan of Care Review  Outcome: Ongoing (interventions implemented as appropriate)   02/15/18 2105 02/16/18 0209   Coping/Psychosocial Response Interventions   Plan Of Care Reviewed With patient;spouse --    Patient Care Overview   Progress --  progress toward functional goals as expected   Outcome Evaluation   Outcome Summary/Follow up Plan --  vss, bp wnl, pain controlled with motrin, up ad megan, breastfeeding fair

## 2018-03-27 ENCOUNTER — POSTPARTUM VISIT (OUTPATIENT)
Dept: OBSTETRICS AND GYNECOLOGY | Age: 28
End: 2018-03-27

## 2018-03-27 VITALS
WEIGHT: 151 LBS | BODY MASS INDEX: 22.36 KG/M2 | SYSTOLIC BLOOD PRESSURE: 118 MMHG | HEIGHT: 69 IN | DIASTOLIC BLOOD PRESSURE: 64 MMHG

## 2018-03-27 PROCEDURE — 0503F POSTPARTUM CARE VISIT: CPT | Performed by: OBSTETRICS & GYNECOLOGY

## 2018-03-27 NOTE — PROGRESS NOTES
"Subjective     Chief Complaint   Patient presents with   • Post-op Follow-up     6 wk postpartum check:Delivered 2/14/18,,breastfeeding Raul,male,8lbs.,7oz.       Iraida Solo is a 28 y.o. female who presents for a postpartum visit. She is 6 weeks postpartum following a spontaneous vaginal delivery. I have fully reviewed the prenatal and intrapartum course. Postpartum course has been uneventful. Baby is feeding by breast. Bleeding has been normal in amount and decreasing.. Bowel function is normal. Bladder function is normal. Patient not sexually active at this time. Contraception method is discussed. Postpartum depression screening: negative.    The following portions of the patient's history were reviewed and updated as appropriate: allergies, current medications, past family history, past medical history, past social history, past surgical history and problem list.    Review of Systems  Review of systems not obtained due to patient factors.    Objective   /64   Ht 175.3 cm (69\")   Wt 68.5 kg (151 lb)   LMP 04/10/2017   Breastfeeding? Yes   BMI 22.30 kg/m²    General:  alert    Breasts:  normal       Heart:  regular rate and rhytm   Abdomen: Normal findings    Vulva:  normal   Vagina: normal   Cervix:  Normal with no cervical motion tenderness   Corpus: Normal for post partum visit   Adnexa:  Non tender, non enlarged         Assessment/Plan   Encounter Diagnosis   Name Primary?   • Postpartum care and examination Yes     Normal postpartum exam. Pap smear done at today's visit.     Contraception: discussed   Slow return to normal activities reviewed. Continue prenatal vitamins.   Follow up in 12 months  Or sooner  as needed.             EMR Dragon/ Transcription disclaimer:  Much of the encounter note is an electronic transcription/translation of spoken language to printed text. The electronic translation of spoken language may permit erroneous, or at times, nonessential words or phrases " to be inadvertently transcribes; Although i have reviewed the note for such errors, some may still exist.

## 2018-03-29 LAB
CYTOLOGIST CVX/VAG CYTO: NORMAL
CYTOLOGY CVX/VAG DOC THIN PREP: NORMAL
DX ICD CODE: NORMAL
HIV 1 & 2 AB SER-IMP: NORMAL
HPV I/H RISK 4 DNA CVX QL PROBE+SIG AMP: NEGATIVE
OTHER STN SPEC: NORMAL
PATH REPORT.FINAL DX SPEC: NORMAL
STAT OF ADQ CVX/VAG CYTO-IMP: NORMAL

## 2018-04-20 ENCOUNTER — TELEPHONE (OUTPATIENT)
Dept: OBSTETRICS AND GYNECOLOGY | Age: 28
End: 2018-04-20

## 2018-04-20 NOTE — TELEPHONE ENCOUNTER
Dr PEREZ pt is 9 wks pp and nursing, can she get the hep A vaccine or are we recommending our pt to get vaccinated who are nursing?

## 2018-04-20 NOTE — TELEPHONE ENCOUNTER
There is no contraindication to getting the vaccine but we are not necessarily recommending it unless pt is in a high risk environment for exposure (food services)

## 2018-11-08 ENCOUNTER — OFFICE VISIT (OUTPATIENT)
Dept: OBSTETRICS AND GYNECOLOGY | Age: 28
End: 2018-11-08

## 2018-11-08 VITALS
WEIGHT: 133 LBS | DIASTOLIC BLOOD PRESSURE: 80 MMHG | HEIGHT: 69 IN | BODY MASS INDEX: 19.7 KG/M2 | SYSTOLIC BLOOD PRESSURE: 120 MMHG

## 2018-11-08 DIAGNOSIS — N76.0 ACUTE VAGINITIS: Primary | ICD-10-CM

## 2018-11-08 LAB
B-HCG UR QL: NEGATIVE
BILIRUB BLD-MCNC: NEGATIVE MG/DL
CLARITY, POC: CLEAR
COLOR UR: YELLOW
GLUCOSE UR STRIP-MCNC: NEGATIVE MG/DL
INTERNAL NEGATIVE CONTROL: NEGATIVE
INTERNAL POSITIVE CONTROL: POSITIVE
KETONES UR QL: NEGATIVE
LEUKOCYTE EST, POC: ABNORMAL
Lab: NORMAL
NITRITE UR-MCNC: NEGATIVE MG/ML
PH UR: 7 [PH] (ref 5–8)
PROT UR STRIP-MCNC: NEGATIVE MG/DL
RBC # UR STRIP: NEGATIVE /UL
SP GR UR: 1.01 (ref 1–1.03)
UROBILINOGEN UR QL: NORMAL

## 2018-11-08 PROCEDURE — 81002 URINALYSIS NONAUTO W/O SCOPE: CPT | Performed by: NURSE PRACTITIONER

## 2018-11-08 PROCEDURE — 81025 URINE PREGNANCY TEST: CPT | Performed by: NURSE PRACTITIONER

## 2018-11-08 PROCEDURE — 99213 OFFICE O/P EST LOW 20 MIN: CPT | Performed by: NURSE PRACTITIONER

## 2018-11-08 RX ORDER — LIDOCAINE 50 MG/G
OINTMENT TOPICAL
Qty: 30 G | Refills: 0 | Status: SHIPPED | OUTPATIENT
Start: 2018-11-08 | End: 2023-01-16

## 2018-11-08 RX ORDER — INFLUENZA A VIRUS A/SINGAPORE/GP1908/2015 IVR-180A (H1N1) ANTIGEN (PROPIOLACTONE INACTIVATED), INFLUENZA A VIRUS A/SINGAPORE/INFIMH-16-0019/2016 IVR-186 (H3N2) ANTIGEN (PROPIOLACTONE INACTIVATED), INFLUENZA B VIRUS B/MARYLAND/15/2016 ANTIGEN (PROPIOLACTONE INACTIVATED), AND INFLUENZA B VIRUS B/PHUKET/3073/2013 BVR-1B ANTIGEN (PROPIOLACTONE INACTIVATED) 15; 15; 15; 15 UG/.5ML; UG/.5ML; UG/.5ML; UG/.5ML
INJECTION, SUSPENSION INTRAMUSCULAR
Refills: 0 | COMMUNITY
Start: 2018-10-09 | End: 2021-10-20

## 2018-11-08 NOTE — PROGRESS NOTES
"Subjective   Iraida Solo is a 28 y.o. female is being seen today for   Chief Complaint   Patient presents with   • Vaginal Discharge     Pt c/o vaginal pain and discharge   .    History of Present Illness     Patient here with new onset vaginal pain and increased discharge. She noticed the discharge about 5 days ago.  It is a lot more than normal and clear in color with slight odor.  She started noticing pain when urine hits the outside of her vagina yesterday.  It is also tender with wiping.  She has never had anything like this before and has only ever had one yeast infection. She is  and has had no new partners.  She has felt \"tired\" the past two days but otherwise feels normal.  They are currently using condoms for birth control.  She is breastfeeding and has not had a cycle yet but she does feel like she may start her cycle soon, she feels slightly crampy and bloated.      The following portions of the patient's history were reviewed and updated as appropriate: allergies, current medications, past family history, past medical history, past social history, past surgical history and problem list.    /80   Ht 175.3 cm (69\")   Wt 60.3 kg (133 lb)   Breastfeeding? Yes   BMI 19.64 kg/m²         Review of Systems   Constitutional: Negative.    HENT: Negative.    Eyes: Negative.    Respiratory: Negative.    Cardiovascular: Negative.    Gastrointestinal: Negative.    Endocrine: Negative.    Genitourinary: Positive for vaginal discharge and vaginal pain.   Musculoskeletal: Negative.    Skin: Negative.    Allergic/Immunologic: Negative.    Neurological: Negative.    Hematological: Negative.    Psychiatric/Behavioral: Negative.        Objective   Physical Exam   Constitutional: She is oriented to person, place, and time. She appears well-developed and well-nourished.   Genitourinary: Vagina normal and uterus normal.       There is lesion on the left labia. Uterus is not tender. Cervix exhibits " discharge. Cervix exhibits no motion tenderness and no friability. Right adnexum displays no tenderness. Left adnexum displays no tenderness.   Genitourinary Comments: Small white lesions noted in one area on left inner labia, close to vaginal opening.  Surrounding area is slightly red.  No lesions internally.  Moderate amount of clear discharge noted on exam.  External area is tender to touch.   Neurological: She is alert and oriented to person, place, and time.   Skin: Skin is warm and dry.   Psychiatric: She has a normal mood and affect.         Assessment/Plan   Iraida was seen today for vaginal discharge.    Diagnoses and all orders for this visit:    Acute vaginitis  -     NuSwab VG+ - Swab, Vagina  -     Herpes Simplex Virus (HSV) 1 & 2, JORGE - ThinPrep Vial, Cervix      Discussed exam with patient.  Mirror used to show her the area of tenderness.  We discussed at length that the appearance is somewhat c/w HSV although patient has no idea where that would have come from and has never had symptoms before.  She also denies that she or her  have a history of oral lesions.  Reassured her that the culture will be more definitive.  Offered treatment in case that is what is going on but she declines today.      If that culture is negative I suspect a contact irritation- possibly from condoms or some other exposure.  She does note that she recently changed fabric softeners.  She will continue to monitor symptoms and call with any concerns.  Culture sent of lesion and vaginal.  Lidocaine cream given to use externally as needed.

## 2018-11-12 ENCOUNTER — OFFICE VISIT (OUTPATIENT)
Dept: OBSTETRICS AND GYNECOLOGY | Age: 28
End: 2018-11-12

## 2018-11-12 ENCOUNTER — TELEPHONE (OUTPATIENT)
Dept: OBSTETRICS AND GYNECOLOGY | Age: 28
End: 2018-11-12

## 2018-11-12 VITALS
HEIGHT: 69 IN | BODY MASS INDEX: 19.4 KG/M2 | WEIGHT: 131 LBS | SYSTOLIC BLOOD PRESSURE: 108 MMHG | DIASTOLIC BLOOD PRESSURE: 60 MMHG

## 2018-11-12 DIAGNOSIS — A60.09 HERPES SIMPLEX INFECTION OF OTHER SITE OF GENITOURINARY TRACT: Primary | ICD-10-CM

## 2018-11-12 PROCEDURE — 99213 OFFICE O/P EST LOW 20 MIN: CPT | Performed by: OBSTETRICS & GYNECOLOGY

## 2018-11-12 RX ORDER — CHLORAL HYDRATE 500 MG
CAPSULE ORAL
COMMUNITY
End: 2021-10-20

## 2018-11-12 NOTE — PROGRESS NOTES
Subjective     Chief Complaint   Patient presents with   • Gynecologic Exam     Problem check: ? hsv       History of Present Illness  Iraida Solo is a 28 y.o. female is being seen today for wanted to be reevaluated for area of concern on her genitalia that was evaluated by her nurse practitioner last week.  Chief Complaint   Patient presents with   • Gynecologic Exam     Problem check: ? hsv   .        The following portions of the patient's history were reviewed and updated as appropriate: allergies, current medications, past family history, past medical history, past social history, past surgical history and problem list.    PAST MEDICAL HISTORY  Past Medical History:   Diagnosis Date   • Anxiety    • Vaginal delivery    • Vaginal delivery      OB History      Para Term  AB Living    3 2 2   1 2    SAB TAB Ectopic Molar Multiple Live Births    1       0 2        Past Surgical History:   Procedure Laterality Date   • WISDOM TOOTH EXTRACTION       No family history on file.  Social History     Tobacco Use   Smoking Status Never Smoker   Smokeless Tobacco Never Used       Current Outpatient Medications:   •  AFLURIA QUADRIVALENT 0.5 ML suspension prefilled syringe injection, ADM 0.5ML IM UTD, Disp: , Rfl: 0  •  lidocaine (XYLOCAINE) 5 % ointment, Apply  topically to the appropriate area as directed Every 2 (Two) Hours As Needed for Mild Pain ., Disp: 30 g, Rfl: 0  •  Multiple Vitamins-Minerals (MULTIVITAMIN GUMMIES WOMENS PO), Take  by mouth., Disp: , Rfl:   •  Omega-3 Fatty Acids (FISH OIL) 1000 MG capsule capsule, Take  by mouth Daily With Breakfast., Disp: , Rfl:   Immunization History   Administered Date(s) Administered   • Flu Vaccine Quad PF >36MO 2017   • Tdap 11/15/2017       Review of Systems       Except as outlined in history of physical illness, patient denies any changes in her GYN, , GI systems. All other systems reviewed are negative.    Objective   Physical Exam    Genitourinary:            Alert and oriented, respirations unlabored, heart regular rate and rhythm   Pelvic as diagrammed above multiple vesicular lesions on the right labia minora and majora previously lesions are resolved on the lower left      Assessment/Plan   Iraida was seen today for gynecologic exam.    Diagnoses and all orders for this visit:    Herpes simplex infection of other site of genitourinary tract      Clinically this is extremely consistent with herpes. Given patient's history this most likely represents a primary outbreak. Etiologies timing other testing offered patient declines all of that she also declines any type of antiviral therapy. Fortunately she has minimal symptoms as this stage. The initial areas of concern as seen last week seemed to be mostly resolved but she has new vesicular and ulcerative lesions as outlined above           EMR Dragon/ Transcription disclaimer:  Much of the encounter note is an electronic transcription/translation of spoken language to printed text. The electronic translation of spoken language may permit erroneous, or at times, nonessential words or phrases to be inadvertently transcribes; Although i have reviewed the note for such errors, some may still exist.

## 2018-11-12 NOTE — TELEPHONE ENCOUNTER
Patient knows her lab results are not back but is wanting to come in today and see Dr. Jimenez. Does not want to see NP/PA.

## 2018-11-13 LAB
A VAGINAE DNA VAG QL NAA+PROBE: ABNORMAL SCORE
A VAGINAE DNA VAG QL NAA+PROBE: NORMAL SCORE
BVAB2 DNA VAG QL NAA+PROBE: ABNORMAL SCORE
BVAB2 DNA VAG QL NAA+PROBE: NORMAL SCORE
C ALBICANS DNA VAG QL NAA+PROBE: NEGATIVE
C ALBICANS DNA VAG QL NAA+PROBE: NEGATIVE
C GLABRATA DNA VAG QL NAA+PROBE: NEGATIVE
C GLABRATA DNA VAG QL NAA+PROBE: NEGATIVE
C TRACH RRNA SPEC QL NAA+PROBE: NEGATIVE
HSV1 DNA SPEC QL NAA+PROBE: POSITIVE
HSV2 DNA SPEC QL NAA+PROBE: NEGATIVE
MEGA1 DNA VAG QL NAA+PROBE: ABNORMAL SCORE
MEGA1 DNA VAG QL NAA+PROBE: NORMAL SCORE
N GONORRHOEA RRNA SPEC QL NAA+PROBE: NEGATIVE
T VAGINALIS RRNA SPEC QL NAA+PROBE: NEGATIVE
T VAGINALIS RRNA SPEC QL NAA+PROBE: NEGATIVE

## 2018-11-13 RX ORDER — VALACYCLOVIR HYDROCHLORIDE 1 G/1
1000 TABLET, FILM COATED ORAL 2 TIMES DAILY
Qty: 10 TABLET | Refills: 3 | Status: SHIPPED | OUTPATIENT
Start: 2018-11-13 | End: 2018-11-18

## 2019-04-01 ENCOUNTER — OFFICE VISIT (OUTPATIENT)
Dept: OBSTETRICS AND GYNECOLOGY | Age: 29
End: 2019-04-01

## 2019-04-01 VITALS
BODY MASS INDEX: 19.4 KG/M2 | HEIGHT: 69 IN | DIASTOLIC BLOOD PRESSURE: 60 MMHG | WEIGHT: 131 LBS | SYSTOLIC BLOOD PRESSURE: 102 MMHG

## 2019-04-01 DIAGNOSIS — Z86.19 HISTORY OF ELISA POSITIVE FOR HSV: ICD-10-CM

## 2019-04-01 DIAGNOSIS — Z01.419 ENCOUNTER FOR GYNECOLOGICAL EXAMINATION: Primary | ICD-10-CM

## 2019-04-01 DIAGNOSIS — Z30.09 GENERAL COUNSELING AND ADVICE FOR CONTRACEPTIVE MANAGEMENT: ICD-10-CM

## 2019-04-01 PROBLEM — Z34.90 PREGNANCY: Status: RESOLVED | Noted: 2018-02-14 | Resolved: 2019-04-01

## 2019-04-01 PROBLEM — Z34.80 PRENATAL CARE OF MULTIGRAVIDA, ANTEPARTUM: Status: RESOLVED | Noted: 2018-01-30 | Resolved: 2019-04-01

## 2019-04-01 PROBLEM — O99.019 ANEMIA AFFECTING PREGNANCY, ANTEPARTUM: Status: RESOLVED | Noted: 2018-01-30 | Resolved: 2019-04-01

## 2019-04-01 PROBLEM — O03.9 SPONTANEOUS MISCARRIAGE: Status: RESOLVED | Noted: 2017-05-02 | Resolved: 2019-04-01

## 2019-04-01 PROBLEM — O16.9 HYPERTENSION AFFECTING PREGNANCY, ANTEPARTUM: Status: RESOLVED | Noted: 2018-02-14 | Resolved: 2019-04-01

## 2019-04-01 PROCEDURE — 99395 PREV VISIT EST AGE 18-39: CPT | Performed by: OBSTETRICS & GYNECOLOGY

## 2019-04-01 RX ORDER — MULTIVIT WITH MINERALS/LUTEIN
250 TABLET ORAL DAILY
COMMUNITY

## 2019-04-01 NOTE — PROGRESS NOTES
"Subjective     Chief Complaint   Patient presents with   • Gynecologic Exam     Annual:last pap 3/2018,discuss bc options         History of Present Illness      Iraida Solo is a very pleasant  29 y.o. female who presents for annual exam.  Mammo Exam none, Contraception has weaned and would like to restart low-dose OCPs risk benefits reviewed, Exercise gets her 10,000 steps a day but not going to the gym at present  Since our diagnosis of HSV she has had no further outbreaks..      Obstetric History:  OB History      Para Term  AB Living    3 2 2   1 2    SAB TAB Ectopic Molar Multiple Live Births    1       0 2         Menstrual History:     Patient's last menstrual period was 2019 (approximate).       Sexual History:       Past Medical History:   Diagnosis Date   • Anxiety    • Vaginal delivery    • Vaginal delivery      Past Surgical History:   Procedure Laterality Date   • WISDOM TOOTH EXTRACTION         SOCIAL Hx:      The following portions of the patient's history were reviewed and updated as appropriate: allergies, current medications, past family history, past medical history, past social history, past surgical history and problem list.    Review of Systems        Except as outlined in history of physical illness, patient denies any changes in her GYN, , GI systems.  All other systems reviewed were negative.         Objective   Physical Exam    /60   Ht 175.3 cm (69\")   Wt 59.4 kg (131 lb)   LMP 2019 (Approximate)   Breastfeeding? No   BMI 19.35 kg/m²     General: Patient is alert and oriented and appears overall healthy  Neck: Is supple without thyromegaly, no carotid bruits and no lymphadenopathy  Lungs: Clear bilaterally, no wheezing, rhonchi, or rales.  Respiratory rate is normal  Breast: Even, symmetrical, no lymphadenopathy, no retraction, no masses or cysts  Heart: Regular rate and rhythm are appreciated, no murmurs or rubs are heard  Abdomen: Is soft, " without organomegaly, bowel sounds are positive, there is no                                rebound or guarding and palpation does not produce any discomfort  Back: Nontender without CVA tenderness  Pelvic: External genitalia appear normal and consistent with mature female.  BUS normal--patient without any lesions vesicles or erythema                            Vagina is clean dry without discharge and appears adequately estrogenized, no               lesions or masses are present                         Cervix is noninflamed without discharge or lesions.  There is no cervical motion             tenderness.                Uterus is nonenlarged, without tenderness, and no masses or abnormalities are  present               Adnexa are non-enlarged, non tender               Rectal exam reveals adequate sphincter tone and no masses or lesions are                     appreciated on digital rectal examination.      Annual Well Woman Exam  Patient Active Problem List   Diagnosis   • Anxiety   • History of FRANCISCO positive for HSV                 Assessment/Plan   Iraida was seen today for gynecologic exam.    Diagnoses and all orders for this visit:    Encounter for gynecological examination  -     Cancel: IGP, Apt HPV,rfx 16 / 18,45  -     IGP,CtNgTv,rfx Aptima HPV ASCU    General counseling and advice for contraceptive management    History of FRANCISCO positive for HSV    Other orders  -     Norethin-Eth Estrad-Fe Biphas (LO LOESTRIN FE) 1 MG-10 MCG / 10 MCG tablet; Take 1 tablet by mouth Daily.            Discussed today's findings and concerns with patient in detail.  Continue to recommend regular exercise to include cardiovascular and resistance training and breast self-exam. Wellness lab, mammography, and pap smear, in accordance with age guidelines.  Nutritional  recommendations  were discussed.    All of the patient's questions were addressed and answered, I have encouraged her to call for today's test results if she  has not received them within 10 days.  Patient is advised to call with any change in her condition or with any other questions, otherwise return in 12 months for annual examination.

## 2019-04-03 LAB
C TRACH RRNA CVX QL NAA+PROBE: NEGATIVE
CONV .: NORMAL
CYTOLOGIST CVX/VAG CYTO: NORMAL
CYTOLOGY CVX/VAG DOC THIN PREP: NORMAL
DX ICD CODE: NORMAL
HIV 1 & 2 AB SER-IMP: NORMAL
N GONORRHOEA RRNA CVX QL NAA+PROBE: NEGATIVE
OTHER STN SPEC: NORMAL
PATH REPORT.FINAL DX SPEC: NORMAL
STAT OF ADQ CVX/VAG CYTO-IMP: NORMAL
T VAGINALIS RRNA SPEC QL NAA+PROBE: NEGATIVE

## 2019-04-04 ENCOUNTER — TELEPHONE (OUTPATIENT)
Dept: OBSTETRICS AND GYNECOLOGY | Age: 29
End: 2019-04-04

## 2019-04-04 LAB
HSV1 DNA SPEC QL NAA+PROBE: NEGATIVE
HSV2 DNA SPEC QL NAA+PROBE: NEGATIVE

## 2019-04-04 NOTE — TELEPHONE ENCOUNTER
----- Message from Jackson Jimenez MD sent at 4/4/2019 11:00 AM EDT -----  Please notify pt. That labs are with in normal limits

## 2019-04-17 ENCOUNTER — TELEPHONE (OUTPATIENT)
Dept: OBSTETRICS AND GYNECOLOGY | Age: 29
End: 2019-04-17

## 2019-04-17 RX ORDER — NORGESTIMATE AND ETHINYL ESTRADIOL 7DAYSX3 LO
1 KIT ORAL DAILY
Qty: 28 TABLET | Refills: 12 | Status: SHIPPED | OUTPATIENT
Start: 2019-04-17 | End: 2019-04-18

## 2019-04-17 NOTE — TELEPHONE ENCOUNTER
See note    Response-notify patient I have discontinued the Loestrin and called in the Tri-Sprintec

## 2019-04-17 NOTE — TELEPHONE ENCOUNTER
Dr Jimenez pt would like to be placed back on Tri Sprintec (generic form). Pt states it is more affordable than the Lo Loestrin. Please send to the [facility] on file. Please Advise

## 2019-04-18 ENCOUNTER — TELEPHONE (OUTPATIENT)
Dept: OBSTETRICS AND GYNECOLOGY | Age: 29
End: 2019-04-18

## 2019-04-18 RX ORDER — NORGESTIMATE AND ETHINYL ESTRADIOL 7DAYSX3 28
1 KIT ORAL DAILY
Qty: 28 TABLET | Refills: 12 | Status: SHIPPED | OUTPATIENT
Start: 2019-04-18 | End: 2020-04-17

## 2019-04-18 NOTE — TELEPHONE ENCOUNTER
We sent in tri LO sprintec yesterday and the pt is requesting regular tri sprintec to be sent instead to the pharm on file

## 2019-05-30 ENCOUNTER — TELEPHONE (OUTPATIENT)
Dept: OBSTETRICS AND GYNECOLOGY | Age: 29
End: 2019-05-30

## 2019-06-03 ENCOUNTER — TELEPHONE (OUTPATIENT)
Dept: OBSTETRICS AND GYNECOLOGY | Age: 29
End: 2019-06-03

## 2019-06-03 ENCOUNTER — OFFICE VISIT (OUTPATIENT)
Dept: OBSTETRICS AND GYNECOLOGY | Age: 29
End: 2019-06-03

## 2019-06-03 VITALS
BODY MASS INDEX: 19.11 KG/M2 | SYSTOLIC BLOOD PRESSURE: 110 MMHG | WEIGHT: 129 LBS | DIASTOLIC BLOOD PRESSURE: 68 MMHG | HEIGHT: 69 IN

## 2019-06-03 DIAGNOSIS — N93.9 VAGINAL BLEEDING PROBLEMS: Primary | ICD-10-CM

## 2019-06-03 DIAGNOSIS — F41.9 ANXIETY: ICD-10-CM

## 2019-06-03 LAB
B-HCG UR QL: NEGATIVE
INTERNAL NEGATIVE CONTROL: NEGATIVE
INTERNAL POSITIVE CONTROL: POSITIVE
Lab: NORMAL

## 2019-06-03 PROCEDURE — 99213 OFFICE O/P EST LOW 20 MIN: CPT | Performed by: OBSTETRICS & GYNECOLOGY

## 2019-06-03 PROCEDURE — 81025 URINE PREGNANCY TEST: CPT | Performed by: OBSTETRICS & GYNECOLOGY

## 2019-06-03 NOTE — TELEPHONE ENCOUNTER
Dr Jimenez pt requests a cb from [facility] or Dr Jimenez as she has questions about her b/c, ovulating & a faint positive pregnancy test taken 5/31/19 yet she had really bad cramping, no bleedingand not sure if she needs to c/i for an u/s to see if she is pregnant or has loss as she has had a 6 wk miscarrage 2 years ago before her now 15 month old son. Please advise.

## 2019-06-03 NOTE — PROGRESS NOTES
Subjective     Chief Complaint   Patient presents with   • Gynecologic Exam     Problem:? ovulation,faint positive pregnancy test 19,increased cramping       History of Present Illness  Iraida Solo is a 29 y.o. female is being seen today for evaluation of a possible early pregnancy.  Patient states her last period was 14 May but she has been on birth control pills and has been taking them regularly.  She thought she might of had a false or faintly positive test a couple days ago.  She stopped her birth control pills in the middle of her cycle and then subsequently had some bleeding afterwards.  Chief Complaint   Patient presents with   • Gynecologic Exam     Problem:? ovulation,faint positive pregnancy test 19,increased cramping   .        The following portions of the patient's history were reviewed and updated as appropriate: allergies, current medications, past family history, past medical history, past social history, past surgical history and problem list.    PAST MEDICAL HISTORY  Past Medical History:   Diagnosis Date   • Anxiety    • Vaginal delivery    • Vaginal delivery      OB History    Para Term  AB Living   3 2 2   1 2   SAB TAB Ectopic Molar Multiple Live Births   1       0 2      # Outcome Date GA Lbr Ian/2nd Weight Sex Delivery Anes PTL Lv   3 Term 18 39w3d 05:23 / 02:01 3840 g (8 lb 7.5 oz) M Vag-Spont EPI N NEIL      Birth Comments: scale 3   2 SAB 17    U    FD   1 Term 08/27/15   3629 g (8 lb) F Vag-Spont  N NEIL        Past Surgical History:   Procedure Laterality Date   • WISDOM TOOTH EXTRACTION       History reviewed. No pertinent family history.  Social History     Tobacco Use   Smoking Status Never Smoker   Smokeless Tobacco Never Used       Current Outpatient Medications:   •  AFLURIA QUADRIVALENT 0.5 ML suspension prefilled syringe injection, ADM 0.5ML IM UTD, Disp: , Rfl: 0  •  lidocaine (XYLOCAINE) 5 % ointment, Apply  topically to the appropriate  area as directed Every 2 (Two) Hours As Needed for Mild Pain ., Disp: 30 g, Rfl: 0  •  Multiple Vitamins-Minerals (MULTIVITAMIN GUMMIES WOMENS PO), Take  by mouth., Disp: , Rfl:   •  Omega-3 Fatty Acids (FISH OIL) 1000 MG capsule capsule, Take  by mouth Daily With Breakfast., Disp: , Rfl:   •  vitamin C (ASCORBIC ACID) 250 MG tablet, Take 250 mg by mouth Daily., Disp: , Rfl:   •  norgestimate-ethinyl estradiol (TRI-SPRINTEC) 0.18/0.215/0.25 MG-35 MCG per tablet, Take 1 tablet by mouth Daily., Disp: 28 tablet, Rfl: 12  Immunization History   Administered Date(s) Administered   • Flu Vaccine Quad PF >36MO 09/29/2017   • Tdap 11/15/2017       Review of Systems       Except as outlined in history of physical illness, patient denies any changes in her GYN, , GI systems. All other systems reviewed are negative.    Objective   Physical Exam   Alert and oriented, respirations unlabored, heart regular rate and rhythm   Pelvic external genitalia normal vagina clean dry intact cervix uterus adnexa not enlarged nontender uterus is certainly well within normal limits.      Assessment/Plan   Iraida was seen today for gynecologic exam.    Diagnoses and all orders for this visit:    Vaginal bleeding problems  -     HCG, B-subunit, Quantitative    Anxiety    As patient has been on her birth control pills regularly pregnancy was unlikely  Today's pelvic exam shows normal findings  The UCG in our office was negative  We will check a beta hCG  If negative patient will use condoms for the next week start a new pack of pills and use back-up method for the next month.  If positive we will treat as early pregnancy               This encounter/visit was over 20 minutes.  Greater than 50% of that time was spent with the patient, examining and discussing her symptoms and explaining care.  Some of the items discussed included possible etiology for irregular bleeding        EMR Dragon/ Transcription disclaimer:  Much of the encounter note is  an electronic transcription/translation of spoken language to printed text. The electronic translation of spoken language may permit erroneous, or at times, nonessential words or phrases to be inadvertently transcribes; Although i have reviewed the note for such errors, some may still exist.

## 2019-06-04 ENCOUNTER — TELEPHONE (OUTPATIENT)
Dept: OBSTETRICS AND GYNECOLOGY | Age: 29
End: 2019-06-04

## 2019-06-04 LAB — HCG INTACT+B SERPL-ACNC: <0.5 MIU/ML

## 2019-06-04 NOTE — TELEPHONE ENCOUNTER
----- Message from Jackson Jimenez MD sent at 6/4/2019 10:48 AM EDT -----  Please notify pt. That labs are with in normal limits

## 2019-11-14 ENCOUNTER — TELEPHONE (OUTPATIENT)
Dept: OBSTETRICS AND GYNECOLOGY | Age: 29
End: 2019-11-14

## 2020-10-14 ENCOUNTER — OFFICE VISIT (OUTPATIENT)
Dept: OBSTETRICS AND GYNECOLOGY | Age: 30
End: 2020-10-14

## 2020-10-14 VITALS
SYSTOLIC BLOOD PRESSURE: 104 MMHG | BODY MASS INDEX: 19.4 KG/M2 | WEIGHT: 131 LBS | DIASTOLIC BLOOD PRESSURE: 68 MMHG | HEIGHT: 69 IN

## 2020-10-14 DIAGNOSIS — Z11.3 SCREEN FOR STD (SEXUALLY TRANSMITTED DISEASE): ICD-10-CM

## 2020-10-14 DIAGNOSIS — Z01.419 ENCOUNTER FOR GYNECOLOGICAL EXAMINATION: Primary | ICD-10-CM

## 2020-10-14 DIAGNOSIS — Z86.19 HISTORY OF ELISA POSITIVE FOR HSV: ICD-10-CM

## 2020-10-14 PROCEDURE — 99395 PREV VISIT EST AGE 18-39: CPT | Performed by: OBSTETRICS & GYNECOLOGY

## 2020-10-14 NOTE — PROGRESS NOTES
"Subjective     Chief Complaint   Patient presents with   • Gynecologic Exam     Annual:last pap ,stopped ocp's         History of Present Illness      Iraida Solo is a very pleasant  30 y.o. female who presents for annual exam.  Mammo Exam none, Contraception using condoms and timing apps, Exercise 4 times a week  Overall patient is doing well, she still little stressed about her diagnosis of HSV which we confirmed, it was type I, she thinks it was obtained from her  with oral sex.  She is only had 1 outbreak per year.  She is a non-smoker she is exercising regularly she request to have STD screens to just be certain.  We talked about the effectiveness levels of condoms and timing.  But they are not ready for any other form of contraception at this stage..      Obstetric History:  OB History        3    Para   2    Term   2            AB   1    Living   2       SAB   1    TAB        Ectopic        Molar        Multiple   0    Live Births   2               Menstrual History:     Patient's last menstrual period was 10/07/2020 (exact date).       Sexual History:       Past Medical History:   Diagnosis Date   • Anxiety    • Vaginal delivery    • Vaginal delivery      Past Surgical History:   Procedure Laterality Date   • WISDOM TOOTH EXTRACTION         SOCIAL Hx:      The following portions of the patient's history were reviewed and updated as appropriate: allergies, current medications, past family history, past medical history, past social history, past surgical history and problem list.    Review of Systems        Except as outlined in history of physical illness, patient denies any changes in her GYN, , GI systems.  All other systems reviewed were negative.         Objective   Physical Exam    /68   Ht 175.3 cm (69\")   Wt 59.4 kg (131 lb)   LMP 10/07/2020 (Exact Date)   Breastfeeding No   BMI 19.35 kg/m²     General: Patient is alert and oriented and appears overall " healthy  Neck: Is supple without thyromegaly, no carotid bruits and no lymphadenopathy  Lungs: Clear bilaterally, no wheezing, rhonchi, or rales.  Respiratory rate is normal  Breast: Even, symmetrical, no lymphadenopathy, no retraction, no masses or cysts  Heart: Regular rate and rhythm are appreciated, no murmurs or rubs are heard  Abdomen: Is soft, without organomegaly, bowel sounds are positive, there is no                                rebound or guarding and palpation does not produce any discomfort  Back: Nontender without CVA tenderness  Pelvic: External genitalia appear normal and consistent with mature female.  BUS normal                            Vagina is clean dry without discharge and appears adequately estrogenized, no               lesions or masses are present                         Cervix is noninflamed without discharge or lesions.  There is no cervical motion             tenderness.                Uterus is nonenlarged, without tenderness, and no masses or abnormalities are  present               Adnexa are non-enlarged, non tender               Rectal exam reveals adequate sphincter tone and no masses or lesions are                     appreciated on digital rectal examination.      Annual Well Woman Exam  Patient Active Problem List   Diagnosis   • Anxiety   • History of FRANCISCO positive for HSV                 Assessment/Plan   Diagnoses and all orders for this visit:    1. Encounter for gynecological examination (Primary)  -     IGP, Apt HPV,rfx 16 / 18,45  -     RPR  -     HIV-1 / O / 2 Ag / Antibody 4th Generation  -     Hepatitis C Antibody    2. Screen for STD (sexually transmitted disease)    3. History of FRANCISCO positive for HSV    Pap smear with GC chlamydia also ordered  Discussed today's findings and concerns with patient.  Continue to recommend regular exercise including cardiovascular and resistance training as well as  breast self-exam. Wellness lab, mammography, & pap smear, in  accordance with age guidelines.    I have encouraged her to call for today's test results if she has not received them within 10 days.  Patient is advised to call with any change in her condition or with any other questions, otherwise return  for annual examination.

## 2020-10-15 LAB
HCV AB S/CO SERPL IA: <0.1 S/CO RATIO (ref 0–0.9)
HIV 1+2 AB+HIV1 P24 AG SERPL QL IA: NON REACTIVE
RPR SER QL: NORMAL

## 2020-10-15 NOTE — PROGRESS NOTES
Please notify pt. That labs are with in normal limits, blood work portion of the screen is negative, cultures and Pap smear are not back yet.

## 2020-10-16 LAB
C TRACH RRNA CVX QL NAA+PROBE: NEGATIVE
CONV .: NORMAL
CYTOLOGIST CVX/VAG CYTO: NORMAL
CYTOLOGY CVX/VAG DOC CYTO: NORMAL
CYTOLOGY CVX/VAG DOC THIN PREP: NORMAL
DX ICD CODE: NORMAL
HIV 1 & 2 AB SER-IMP: NORMAL
N GONORRHOEA RRNA CVX QL NAA+PROBE: NEGATIVE
OTHER STN SPEC: NORMAL
STAT OF ADQ CVX/VAG CYTO-IMP: NORMAL
T VAGINALIS RRNA SPEC QL NAA+PROBE: NEGATIVE

## 2020-10-19 ENCOUNTER — TELEPHONE (OUTPATIENT)
Dept: OBSTETRICS AND GYNECOLOGY | Age: 30
End: 2020-10-19

## 2020-10-19 NOTE — TELEPHONE ENCOUNTER
TC for results:  labs are with in normal limits, blood work portion of the screen is negative, cultures and Pap smear are not back yet.

## 2020-10-21 ENCOUNTER — TELEPHONE (OUTPATIENT)
Dept: OBSTETRICS AND GYNECOLOGY | Age: 30
End: 2020-10-21

## 2020-10-21 NOTE — TELEPHONE ENCOUNTER
----- Message from Jackson Jimenez MD sent at 10/15/2020  6:54 AM EDT -----  Please notify pt. That labs are with in normal limits, blood work portion of the screen is negative, cultures and Pap smear are not back yet.

## 2020-10-22 ENCOUNTER — TELEPHONE (OUTPATIENT)
Dept: OBSTETRICS AND GYNECOLOGY | Age: 30
End: 2020-10-22

## 2020-10-22 NOTE — TELEPHONE ENCOUNTER
----- Message from TESS Lim sent at 10/22/2020  1:19 PM EDT -----  Let pt know her pap and std results are negative

## 2021-10-20 ENCOUNTER — OFFICE VISIT (OUTPATIENT)
Dept: OBSTETRICS AND GYNECOLOGY | Age: 31
End: 2021-10-20

## 2021-10-20 VITALS
DIASTOLIC BLOOD PRESSURE: 64 MMHG | WEIGHT: 133 LBS | BODY MASS INDEX: 19.7 KG/M2 | SYSTOLIC BLOOD PRESSURE: 108 MMHG | HEIGHT: 69 IN

## 2021-10-20 DIAGNOSIS — Z30.09 GENERAL COUNSELING AND ADVICE FOR CONTRACEPTIVE MANAGEMENT: ICD-10-CM

## 2021-10-20 DIAGNOSIS — Z01.419 ENCOUNTER FOR GYNECOLOGICAL EXAMINATION: Primary | ICD-10-CM

## 2021-10-20 DIAGNOSIS — Z86.19 HISTORY OF ELISA POSITIVE FOR HSV: ICD-10-CM

## 2021-10-20 PROCEDURE — 99395 PREV VISIT EST AGE 18-39: CPT | Performed by: OBSTETRICS & GYNECOLOGY

## 2021-10-20 RX ORDER — VIT C/B6/B5/MAGNESIUM/HERB 173 50-5-6-5MG
CAPSULE ORAL
COMMUNITY

## 2021-10-20 RX ORDER — MELATONIN
1000 DAILY
COMMUNITY

## 2021-10-20 NOTE — PROGRESS NOTES
Subjective     Chief Complaint   Patient presents with   • Gynecologic Exam     Annual:last pap 10/20         History of Present Illness      Iraida Solo is a very pleasant  31 y.o. female who presents for annual exam.  Mammo Exam none, Contraception condoms but we had a discussion regarding different options and currently they are not totally sure they are done having children so they will continue to use those.  If she wants to proceed with something different she will give us a call, Exercise 3 times a week    Patient is still working, her 2 children are doing well    She has no gynecological problems or concerns other than preconceptual discussions outlined above.      Obstetric History:  OB History        3    Para   2    Term   2            AB   1    Living   2       SAB   1    IAB        Ectopic        Molar        Multiple   0    Live Births   2               Menstrual History:     Patient's last menstrual period was 2021 (approximate).       Sexual History:       Past Medical History:   Diagnosis Date   • Anxiety    • Vaginal delivery    • Vaginal delivery      Past Surgical History:   Procedure Laterality Date   • WISDOM TOOTH EXTRACTION         SOCIAL Hx:      The following portions of the patient's history were reviewed and updated as appropriate: allergies, current medications, past family history, past medical history, past social history, past surgical history and problem list.    Review of Systems        Except as outlined in history of physical illness, patient denies any changes in her GYN, , GI systems.  All other systems reviewed were negative.         Current Outpatient Medications:   •  cholecalciferol (VITAMIN D3) 25 MCG (1000 UT) tablet, Take 1,000 Units by mouth Daily., Disp: , Rfl:   •  Turmeric 500 MG capsule, Take  by mouth., Disp: , Rfl:   •  lidocaine (XYLOCAINE) 5 % ointment, Apply  topically to the appropriate area as directed Every 2 (Two) Hours As Needed  "for Mild Pain ., Disp: 30 g, Rfl: 0  •  vitamin C (ASCORBIC ACID) 250 MG tablet, Take 250 mg by mouth Daily., Disp: , Rfl:    Objective   Physical Exam    /64   Ht 175.3 cm (69\")   Wt 60.3 kg (133 lb)   LMP 09/25/2021 (Approximate)   Breastfeeding No   BMI 19.64 kg/m²     General: Patient is alert and oriented and appears overall healthy  Neck: Is supple without thyromegaly, no carotid bruits and no lymphadenopathy  Lungs: Clear bilaterally, no wheezing, rhonchi, or rales.  Respiratory rate is normal  Breast: Even, symmetrical, no lymphadenopathy, no retraction, no masses or cysts  Heart: Regular rate and rhythm are appreciated, no murmurs or rubs are heard  Abdomen: Is soft, without organomegaly, bowel sounds are positive, there is no rebound or guarding and palpation does not produce any discomfort  Back: Nontender without CVA tenderness  Pelvic: External genitalia appear normal and consistent with mature female.  BUS normal                            Vagina is clean dry without discharge and appears adequately estrogenized, no lesions or masses are present                         Cervix is noninflamed without discharge or lesions.  There is no cervical motion tenderness.                Uterus is nonenlarged, without tenderness, and no masses or abnormalities are  present               Adnexa are non-enlarged, non tender               Rectal exam reveals adequate sphincter tone and no masses or lesions are appreciated on digital rectal examination.      Annual Well Woman Exam  Patient Active Problem List   Diagnosis   • Anxiety   • History of FRANCISCO positive for HSV                 Assessment/Plan   Diagnoses and all orders for this visit:    1. Encounter for gynecological examination (Primary)  -     IGP, Apt HPV,rfx 16 / 18,45    2. History of FRANCISCO positive for HSV  -     HSV 1 & 2 - Specific Antibody, IgG  -     IGP, Apt HPV,rfx 16 / 18,45    3. General counseling and advice for contraceptive " management      Discussed today's findings and concerns with patient.  Continue to recommend regular exercise including cardiovascular and resistance training as well as  breast self-exam. Wellness lab, mammography, & pap smear, in accordance with age guidelines.    I have encouraged her to call for today's test results if she has not received them within 10 days.  Patient is advised to call with any change in her condition or with any other questions, otherwise return  for annual examination.

## 2021-10-21 ENCOUNTER — TELEPHONE (OUTPATIENT)
Dept: OBSTETRICS AND GYNECOLOGY | Age: 31
End: 2021-10-21

## 2021-10-21 LAB
HSV1 IGG SER IA-ACNC: <0.91 INDEX (ref 0–0.9)
HSV2 IGG SER IA-ACNC: <0.91 INDEX (ref 0–0.9)

## 2021-10-21 NOTE — PROGRESS NOTES
Please notify pt. That labs are with in normal limits no current antibodies are detected in her system

## 2021-10-21 NOTE — TELEPHONE ENCOUNTER
----- Message from Jackson Jimenez MD sent at 10/21/2021 10:57 AM EDT -----   Please notify pt. That labs are with in normal limits no current antibodies are detected in her system

## 2021-10-25 LAB
CYTOLOGIST CVX/VAG CYTO: NORMAL
CYTOLOGY CVX/VAG DOC CYTO: NORMAL
CYTOLOGY CVX/VAG DOC THIN PREP: NORMAL
DX ICD CODE: NORMAL
HIV 1 & 2 AB SER-IMP: NORMAL
HPV I/H RISK 4 DNA CVX QL PROBE+SIG AMP: NEGATIVE
Lab: NORMAL
OTHER STN SPEC: NORMAL
STAT OF ADQ CVX/VAG CYTO-IMP: NORMAL

## 2023-01-16 ENCOUNTER — OFFICE VISIT (OUTPATIENT)
Dept: OBSTETRICS AND GYNECOLOGY | Age: 33
End: 2023-01-16
Payer: COMMERCIAL

## 2023-01-16 ENCOUNTER — TELEPHONE (OUTPATIENT)
Dept: OBSTETRICS AND GYNECOLOGY | Age: 33
End: 2023-01-16

## 2023-01-16 VITALS
DIASTOLIC BLOOD PRESSURE: 76 MMHG | HEIGHT: 69 IN | WEIGHT: 136 LBS | BODY MASS INDEX: 20.14 KG/M2 | SYSTOLIC BLOOD PRESSURE: 124 MMHG

## 2023-01-16 DIAGNOSIS — N92.6 IRREGULAR MENSES: ICD-10-CM

## 2023-01-16 DIAGNOSIS — O36.80X0 PREGNANCY WITH INCONCLUSIVE FETAL VIABILITY, SINGLE OR UNSPECIFIED FETUS: ICD-10-CM

## 2023-01-16 DIAGNOSIS — Z13.89 SCREENING FOR BLOOD OR PROTEIN IN URINE: Primary | ICD-10-CM

## 2023-01-16 LAB
GLUCOSE UR STRIP-MCNC: NEGATIVE MG/DL
PROT UR STRIP-MCNC: NEGATIVE MG/DL

## 2023-01-16 PROCEDURE — 99213 OFFICE O/P EST LOW 20 MIN: CPT | Performed by: OBSTETRICS & GYNECOLOGY

## 2023-01-16 NOTE — TELEPHONE ENCOUNTER
Caller: Iraida Solo    Relationship to patient: Self    Best call back number: [phone]-CALL ANYTIME, IT IS OKAY TO M.    Chief complaint: PREGNANCY CONFIRMATION-LEFT OVARIAN PAIN.    Type of visit: NEW OB AND ULTRASOUND    Requested date: 01.23.23      Additional notes:  LMP 12.10.22  POSITIVE HOME TEST 01.08.23    OVARIAN PAIN HAS BEEN INTERMITTENT FOR ABOUT A WEEK. PAIN/CRAMPING IS NOT SEVERE.   PT DECLINED VAGINAL BLEEDING.    PT WOULD LIKE TO COME IN TO HAVE VAGINAL ULTRASOUND TO CHECK ON PREGNANCY. PT HAS CONCERNS DUE TO PREVIOUS MISCARRIAGE.   OFFICE/[facility] ADVISED TO SEND T.E

## 2023-01-16 NOTE — ADDENDUM NOTE
Addended by: GIOVANNA DALEY on: 1/16/2023 02:38 PM     Modules accepted: Orders, Level of Service

## 2023-01-16 NOTE — PROGRESS NOTES
Subjective       History of Present Illness  Iraida Solo is a 32 y.o. female is being seen today for irregular bleeding possible early pregnancy  Chief Complaint   Patient presents with   • Follow-up     Left ovarian pain early pregnancy, hx of miscarriage    .        The following portions of the patient's history were reviewed and updated as appropriate: allergies, current medications, past family history, past medical history, past social history, past surgical history and problem list.    PAST MEDICAL HISTORY  Past Medical History:   Diagnosis Date   • Anxiety    • Vaginal delivery    • Vaginal delivery      OB History    Para Term  AB Living   3 2 2   1 2   SAB IAB Ectopic Molar Multiple Live Births   1       0 2      # Outcome Date GA Lbr Ian/2nd Weight Sex Delivery Anes PTL Lv   3 Term 18 39w3d 05:23 / 02:01 3840 g (8 lb 7.5 oz) M Vag-Spont EPI N NEIL      Birth Comments: scale 3   2 SAB 17    U    FD   1 Term 08/27/15   3629 g (8 lb) F Vag-Spont  N NEIL     Past Surgical History:   Procedure Laterality Date   • WISDOM TOOTH EXTRACTION       No family history on file.  Social History     Tobacco Use   Smoking Status Never   Smokeless Tobacco Never       Current Outpatient Medications:   •  cholecalciferol (VITAMIN D3) 25 MCG (1000 UT) tablet, Take 1,000 Units by mouth Daily., Disp: , Rfl:   •  Turmeric 500 MG capsule, Take  by mouth., Disp: , Rfl:   •  vitamin C (ASCORBIC ACID) 250 MG tablet, Take 250 mg by mouth Daily., Disp: , Rfl:   •  lidocaine (XYLOCAINE) 5 % ointment, Apply  topically to the appropriate area as directed Every 2 (Two) Hours As Needed for Mild Pain ., Disp: 30 g, Rfl: 0  Immunization History   Administered Date(s) Administered   • Flu Vaccine Quad PF >36MO 2017   • Tdap 11/15/2017       Review of Systems       Except as outlined in history of physical illness, patient denies any changes in her GYN, , GI systems. All other systems reviewed are  negative.    Objective   Physical Exam   Alert and oriented, respirations unlabored, heart regular rate and rhythm   Pelvic Limited to ultrasound        Assessment & Plan   Diagnoses and all orders for this visit:    1. Screening for blood or protein in urine (Primary)  -     POC Urinalysis Dipstick    2. Pregnancy with inconclusive fetal viability, single or unspecified fetus  -     Progesterone  -     OB Panel With HIV  -     HCG, B-subunit, Quantitative    3. Irregular menses  -     Progesterone  -     OB Panel With HIV  -     HCG, B-subunit, Quantitative    Possible very early pregnancy, suspect PORTIA of 9/14/2023 with  5 weeks gestation, will check new OB labs follow-up with ultrasound.             Orders Placed This Encounter   Procedures   • Progesterone     Order Specific Question:   Release to patient     Answer:   Routine Release   • OB Panel With HIV     Order Specific Question:   Release to patient     Answer:   Routine Release   • HCG, B-subunit, Quantitative     Order Specific Question:   Release to patient     Answer:   Routine Release   • POC Urinalysis Dipstick     Order Specific Question:   Release to patient     Answer:   Routine Release           EMR Dragon/ Transcription disclaimer:  Much of the encounter note is an electronic transcription/translation of spoken language to printed text. The electronic translation of spoken language may permit erroneous, or at times, nonessential words or phrases to be inadvertently transcribes; Although i have reviewed the note for such errors, some may still exist.

## 2023-01-17 ENCOUNTER — TELEPHONE (OUTPATIENT)
Dept: OBSTETRICS AND GYNECOLOGY | Age: 33
End: 2023-01-17
Payer: COMMERCIAL

## 2023-01-17 LAB
HCG INTACT+B SERPL-ACNC: NORMAL MIU/ML
PROGEST SERPL-MCNC: 7.4 NG/ML

## 2023-01-17 RX ORDER — PROGESTERONE 100 MG/1
CAPSULE ORAL
Qty: 60 CAPSULE | Refills: 1 | Status: SHIPPED | OUTPATIENT
Start: 2023-01-17

## 2023-01-17 NOTE — TELEPHONE ENCOUNTER
LM notifying pt of results:   hCG pregnancy hormone level was good, progesterone is borderline so to be safe I have called in some progesterone capsules that I want her to put inside the vagina each night before she goes to bed.  We will continue those until her next follow-up visit.